# Patient Record
Sex: FEMALE | Race: OTHER | HISPANIC OR LATINO | URBAN - METROPOLITAN AREA
[De-identification: names, ages, dates, MRNs, and addresses within clinical notes are randomized per-mention and may not be internally consistent; named-entity substitution may affect disease eponyms.]

---

## 2019-04-17 ENCOUNTER — OUTPATIENT (OUTPATIENT)
Dept: OUTPATIENT SERVICES | Facility: HOSPITAL | Age: 74
LOS: 1 days | Discharge: HOME | End: 2019-04-17

## 2019-04-17 DIAGNOSIS — Z00.00 ENCOUNTER FOR GENERAL ADULT MEDICAL EXAMINATION WITHOUT ABNORMAL FINDINGS: ICD-10-CM

## 2019-06-13 ENCOUNTER — APPOINTMENT (OUTPATIENT)
Dept: NEUROSURGERY | Facility: CLINIC | Age: 74
End: 2019-06-13
Payer: MEDICARE

## 2019-06-13 VITALS — BODY MASS INDEX: 28 KG/M2 | HEIGHT: 64 IN | WEIGHT: 164 LBS

## 2019-06-13 VITALS — WEIGHT: 165 LBS | HEIGHT: 64 IN | BODY MASS INDEX: 28.17 KG/M2

## 2019-06-13 DIAGNOSIS — M54.16 RADICULOPATHY, LUMBAR REGION: ICD-10-CM

## 2019-06-13 DIAGNOSIS — Z86.69 PERSONAL HISTORY OF OTHER DISEASES OF THE NERVOUS SYSTEM AND SENSE ORGANS: ICD-10-CM

## 2019-06-13 DIAGNOSIS — Z82.3 FAMILY HISTORY OF STROKE: ICD-10-CM

## 2019-06-13 DIAGNOSIS — Z80.0 FAMILY HISTORY OF MALIGNANT NEOPLASM OF DIGESTIVE ORGANS: ICD-10-CM

## 2019-06-13 PROCEDURE — 99205 OFFICE O/P NEW HI 60 MIN: CPT

## 2019-06-13 RX ORDER — GABAPENTIN 300 MG/1
300 CAPSULE ORAL EVERY 8 HOURS
Qty: 90 | Refills: 5 | Status: ACTIVE | COMMUNITY
Start: 2019-06-13 | End: 1900-01-01

## 2019-06-13 NOTE — PLAN
[FreeTextEntry1] : Patient referred to pain management and physical therapy. \par Prescribe gabapentin.

## 2019-06-13 NOTE — HISTORY OF PRESENT ILLNESS
[FreeTextEntry1] : low back pain [de-identified] : This is a 73yrs old female who presents today accompanied by her son presents today for a consultation of left sided back pain radiating to the left lateral leg down to the left ankle since December 2018. It is associated with numbness on the left big toe and occasional weakness. Patient also mentioned in 2015 she was experiencing low back pain radiating to the right groin and lateral leg. Patient participated in physical therapy in the past and had temporary relief of her symptoms. She has not had a consultation with pain management. Pain is worse with prolong standing and walking, and pain is better when she takes ibuprofen. \par Patient does not have any recent imaging.

## 2019-09-26 ENCOUNTER — APPOINTMENT (OUTPATIENT)
Dept: OBGYN | Facility: CLINIC | Age: 74
End: 2019-09-26
Payer: MEDICARE

## 2019-09-26 ENCOUNTER — LABORATORY RESULT (OUTPATIENT)
Age: 74
End: 2019-09-26

## 2019-09-26 VITALS — BODY MASS INDEX: 27.81 KG/M2 | SYSTOLIC BLOOD PRESSURE: 132 MMHG | DIASTOLIC BLOOD PRESSURE: 78 MMHG | WEIGHT: 162 LBS

## 2019-09-26 DIAGNOSIS — Z83.2 FAMILY HISTORY OF DISEASES OF THE BLOOD AND BLOOD-FORMING ORGANS AND CERTAIN DISORDERS INVOLVING THE IMMUNE MECHANISM: ICD-10-CM

## 2019-09-26 PROCEDURE — 99397 PER PM REEVAL EST PAT 65+ YR: CPT

## 2019-09-26 PROCEDURE — 99212 OFFICE O/P EST SF 10 MIN: CPT | Mod: 25

## 2019-10-03 PROBLEM — Z83.2 FAMILY HISTORY OF THROMBOCYTOPENIA: Status: ACTIVE | Noted: 2019-09-26

## 2019-10-03 NOTE — PHYSICAL EXAM
[Awake] : awake [Alert] : alert [Soft] : soft [Oriented x3] : oriented to person, place, and time [Normal] : cervix [Uterine Adnexae] : were not tender and not enlarged [No Bleeding] : there was no active vaginal bleeding [RRR, No Murmurs] : RRR, no murmurs [CTAB] : CTAB [Acute Distress] : no acute distress [LAD] : no lymphadenopathy [Thyroid Nodule] : no thyroid nodule [Mass] : no breast mass [Goiter] : no goiter [Nipple Discharge] : no nipple discharge [Axillary LAD] : no axillary lymphadenopathy [Tender] : non tender [Distended] : not distended [H/Smegaly] : no hepatosplenomegaly [Depressed Mood] : not depressed [Flat Affect] : affect not flat

## 2019-10-09 ENCOUNTER — APPOINTMENT (OUTPATIENT)
Dept: OBGYN | Facility: CLINIC | Age: 74
End: 2019-10-09
Payer: MEDICARE

## 2019-10-09 ENCOUNTER — ASOB RESULT (OUTPATIENT)
Age: 74
End: 2019-10-09

## 2019-10-09 PROCEDURE — 76830 TRANSVAGINAL US NON-OB: CPT

## 2019-10-16 ENCOUNTER — APPOINTMENT (OUTPATIENT)
Dept: OBGYN | Facility: CLINIC | Age: 74
End: 2019-10-16
Payer: MEDICARE

## 2019-10-16 ENCOUNTER — LABORATORY RESULT (OUTPATIENT)
Age: 74
End: 2019-10-16

## 2019-10-16 VITALS — BODY MASS INDEX: 27.81 KG/M2 | WEIGHT: 162 LBS | DIASTOLIC BLOOD PRESSURE: 80 MMHG | SYSTOLIC BLOOD PRESSURE: 130 MMHG

## 2019-10-16 DIAGNOSIS — N90.89 OTHER SPECIFIED NONINFLAMMATORY DISORDERS OF VULVA AND PERINEUM: ICD-10-CM

## 2019-10-16 DIAGNOSIS — B35.6 TINEA CRURIS: ICD-10-CM

## 2019-10-16 PROCEDURE — 99212 OFFICE O/P EST SF 10 MIN: CPT | Mod: 25

## 2019-10-16 PROCEDURE — 11422 EXC H-F-NK-SP B9+MARG 1.1-2: CPT

## 2019-10-16 PROCEDURE — 56605 BIOPSY OF VULVA/PERINEUM: CPT

## 2019-10-16 RX ORDER — CLOTRIMAZOLE AND BETAMETHASONE DIPROPIONATE 10; .5 MG/ML; MG/ML
1-0.05 LOTION TOPICAL TWICE DAILY
Qty: 1 | Refills: 3 | Status: ACTIVE | COMMUNITY
Start: 2019-10-16 | End: 1900-01-01

## 2019-10-16 NOTE — PHYSICAL EXAM
[Normal] : uterus [No Bleeding] : there was no active vaginal bleeding [Uterine Adnexae] : were not tender and not enlarged [de-identified] : left forearm ring like lession raised edges and clear center non tender .  [de-identified] : left labia 2-3 cm nodule

## 2019-10-23 ENCOUNTER — APPOINTMENT (OUTPATIENT)
Dept: OBGYN | Facility: CLINIC | Age: 74
End: 2019-10-23
Payer: MEDICARE

## 2019-10-23 VITALS — DIASTOLIC BLOOD PRESSURE: 70 MMHG | WEIGHT: 162 LBS | BODY MASS INDEX: 27.81 KG/M2 | SYSTOLIC BLOOD PRESSURE: 125 MMHG

## 2019-10-23 DIAGNOSIS — N76.3 SUBACUTE AND CHRONIC VULVITIS: ICD-10-CM

## 2019-10-23 PROCEDURE — 99024 POSTOP FOLLOW-UP VISIT: CPT

## 2019-10-23 NOTE — PHYSICAL EXAM
[Normal] : uterus [No Bleeding] : there was no active vaginal bleeding [Uterine Adnexae] : were not tender and not enlarged [de-identified] : sutures removed healing well .

## 2019-11-08 ENCOUNTER — APPOINTMENT (OUTPATIENT)
Dept: OBGYN | Facility: CLINIC | Age: 74
End: 2019-11-08
Payer: MEDICARE

## 2019-11-08 VITALS — BODY MASS INDEX: 27.81 KG/M2 | SYSTOLIC BLOOD PRESSURE: 116 MMHG | WEIGHT: 162 LBS | DIASTOLIC BLOOD PRESSURE: 78 MMHG

## 2019-11-08 PROCEDURE — 99213 OFFICE O/P EST LOW 20 MIN: CPT

## 2019-11-08 NOTE — COUNSELING
[Breast Self Exam] : breast self exam [Nutrition] : nutrition [Exercise] : exercise [Vitamins/Supplements] : vitamins/supplements [STD (testing, results, tx)] : STD (testing, results, tx) [Vulvar Hygiene] : vulvar hygiene [Weight Management] : weight management

## 2019-11-13 ENCOUNTER — TRANSCRIPTION ENCOUNTER (OUTPATIENT)
Age: 74
End: 2019-11-13

## 2019-11-13 ENCOUNTER — APPOINTMENT (OUTPATIENT)
Dept: GYNECOLOGIC ONCOLOGY | Facility: CLINIC | Age: 74
End: 2019-11-13
Payer: MEDICARE

## 2019-11-13 VITALS
HEART RATE: 83 BPM | BODY MASS INDEX: 27.85 KG/M2 | DIASTOLIC BLOOD PRESSURE: 83 MMHG | SYSTOLIC BLOOD PRESSURE: 162 MMHG | HEIGHT: 64 IN | WEIGHT: 163.13 LBS

## 2019-11-13 DIAGNOSIS — Z78.9 OTHER SPECIFIED HEALTH STATUS: ICD-10-CM

## 2019-11-13 DIAGNOSIS — Z87.891 PERSONAL HISTORY OF NICOTINE DEPENDENCE: ICD-10-CM

## 2019-11-13 DIAGNOSIS — Z80.49 FAMILY HISTORY OF MALIGNANT NEOPLASM OF OTHER GENITAL ORGANS: ICD-10-CM

## 2019-11-13 DIAGNOSIS — G47.33 OBSTRUCTIVE SLEEP APNEA (ADULT) (PEDIATRIC): ICD-10-CM

## 2019-11-13 PROCEDURE — 99205 OFFICE O/P NEW HI 60 MIN: CPT

## 2019-11-13 PROCEDURE — 99215 OFFICE O/P EST HI 40 MIN: CPT

## 2019-11-13 PROCEDURE — 99245 OFF/OP CONSLTJ NEW/EST HI 55: CPT

## 2019-11-21 ENCOUNTER — APPOINTMENT (OUTPATIENT)
Dept: OBGYN | Facility: HOSPITAL | Age: 74
End: 2019-11-21
Payer: MEDICARE

## 2019-11-21 VITALS — SYSTOLIC BLOOD PRESSURE: 118 MMHG | BODY MASS INDEX: 27.29 KG/M2 | DIASTOLIC BLOOD PRESSURE: 82 MMHG | WEIGHT: 159 LBS

## 2019-11-21 PROCEDURE — 99213 OFFICE O/P EST LOW 20 MIN: CPT

## 2020-01-08 ENCOUNTER — APPOINTMENT (OUTPATIENT)
Dept: GYNECOLOGIC ONCOLOGY | Facility: CLINIC | Age: 75
End: 2020-01-08

## 2020-01-14 ENCOUNTER — OUTPATIENT (OUTPATIENT)
Dept: OUTPATIENT SERVICES | Facility: HOSPITAL | Age: 75
LOS: 1 days | End: 2020-01-14
Payer: MEDICARE

## 2020-01-14 VITALS
DIASTOLIC BLOOD PRESSURE: 86 MMHG | SYSTOLIC BLOOD PRESSURE: 142 MMHG | WEIGHT: 164.02 LBS | OXYGEN SATURATION: 98 % | HEART RATE: 75 BPM | TEMPERATURE: 99 F | HEIGHT: 63.5 IN | RESPIRATION RATE: 15 BRPM

## 2020-01-14 DIAGNOSIS — C51.9 MALIGNANT NEOPLASM OF VULVA, UNSPECIFIED: ICD-10-CM

## 2020-01-14 DIAGNOSIS — H26.9 UNSPECIFIED CATARACT: Chronic | ICD-10-CM

## 2020-01-14 LAB
ANION GAP SERPL CALC-SCNC: 14 MMO/L — SIGNIFICANT CHANGE UP (ref 7–14)
BUN SERPL-MCNC: 17 MG/DL — SIGNIFICANT CHANGE UP (ref 7–23)
CALCIUM SERPL-MCNC: 9.5 MG/DL — SIGNIFICANT CHANGE UP (ref 8.4–10.5)
CHLORIDE SERPL-SCNC: 102 MMOL/L — SIGNIFICANT CHANGE UP (ref 98–107)
CO2 SERPL-SCNC: 25 MMOL/L — SIGNIFICANT CHANGE UP (ref 22–31)
CREAT SERPL-MCNC: 0.95 MG/DL — SIGNIFICANT CHANGE UP (ref 0.5–1.3)
GLUCOSE SERPL-MCNC: 92 MG/DL — SIGNIFICANT CHANGE UP (ref 70–99)
HCT VFR BLD CALC: 39.5 % — SIGNIFICANT CHANGE UP (ref 34.5–45)
HGB BLD-MCNC: 13.4 G/DL — SIGNIFICANT CHANGE UP (ref 11.5–15.5)
MCHC RBC-ENTMCNC: 32.7 PG — SIGNIFICANT CHANGE UP (ref 27–34)
MCHC RBC-ENTMCNC: 33.9 % — SIGNIFICANT CHANGE UP (ref 32–36)
MCV RBC AUTO: 96.3 FL — SIGNIFICANT CHANGE UP (ref 80–100)
NRBC # FLD: 0 K/UL — SIGNIFICANT CHANGE UP (ref 0–0)
PLATELET # BLD AUTO: 298 K/UL — SIGNIFICANT CHANGE UP (ref 150–400)
PMV BLD: 9.2 FL — SIGNIFICANT CHANGE UP (ref 7–13)
POTASSIUM SERPL-MCNC: 3.6 MMOL/L — SIGNIFICANT CHANGE UP (ref 3.5–5.3)
POTASSIUM SERPL-SCNC: 3.6 MMOL/L — SIGNIFICANT CHANGE UP (ref 3.5–5.3)
RBC # BLD: 4.1 M/UL — SIGNIFICANT CHANGE UP (ref 3.8–5.2)
RBC # FLD: 12.5 % — SIGNIFICANT CHANGE UP (ref 10.3–14.5)
SODIUM SERPL-SCNC: 141 MMOL/L — SIGNIFICANT CHANGE UP (ref 135–145)
WBC # BLD: 8.28 K/UL — SIGNIFICANT CHANGE UP (ref 3.8–10.5)
WBC # FLD AUTO: 8.28 K/UL — SIGNIFICANT CHANGE UP (ref 3.8–10.5)

## 2020-01-14 PROCEDURE — 93010 ELECTROCARDIOGRAM REPORT: CPT

## 2020-01-14 NOTE — H&P PST ADULT - HISTORY OF PRESENT ILLNESS
73y/o female presents for preop eval for scheduled simple vulvectomy on 1/17/2020.  Pt states has small lump, biopsy done and was positive for cancer cell.  Preop Dx malignant neoplasm of vulva.

## 2020-01-14 NOTE — H&P PST ADULT - ATTENDING COMMENTS
pt seen and evaluated, plan for EUA, left simple vulvectomy, all indicated procedures  discussed risks including bleeding, infection, injury to bowel/bladder/vessels/nerves/ureters, risks of blood transfusion, reoperation, ICU admission  postop care discussed  son and daughter at bedside.

## 2020-01-14 NOTE — H&P PST ADULT - NSICDXPROBLEM_GEN_ALL_CORE_FT
PROBLEM DIAGNOSES  Problem: Malignant neoplasm of vulva  Assessment and Plan: Written & verbal preop instructions, gi prophylaxis given  pt verbalized understanding  medical eval requested by surgeon  pending copy of report PROBLEM DIAGNOSES  Problem: Malignant neoplasm of vulva  Assessment and Plan: Written & verbal preop instructions, gi prophylaxis given  pt verbalized understanding  medical eval requested by surgeon  pending copy of report  with comparison eKg

## 2020-01-16 ENCOUNTER — TRANSCRIPTION ENCOUNTER (OUTPATIENT)
Age: 75
End: 2020-01-16

## 2020-01-17 ENCOUNTER — APPOINTMENT (OUTPATIENT)
Dept: GYNECOLOGIC ONCOLOGY | Facility: HOSPITAL | Age: 75
End: 2020-01-17

## 2020-01-17 ENCOUNTER — RESULT REVIEW (OUTPATIENT)
Age: 75
End: 2020-01-17

## 2020-01-17 ENCOUNTER — OUTPATIENT (OUTPATIENT)
Dept: OUTPATIENT SERVICES | Facility: HOSPITAL | Age: 75
LOS: 1 days | Discharge: HOME | End: 2020-01-17
Payer: MEDICARE

## 2020-01-17 VITALS
DIASTOLIC BLOOD PRESSURE: 88 MMHG | TEMPERATURE: 98 F | RESPIRATION RATE: 18 BRPM | SYSTOLIC BLOOD PRESSURE: 169 MMHG | HEIGHT: 63.5 IN | OXYGEN SATURATION: 96 % | WEIGHT: 164.02 LBS | HEART RATE: 66 BPM

## 2020-01-17 VITALS
HEART RATE: 59 BPM | RESPIRATION RATE: 16 BRPM | SYSTOLIC BLOOD PRESSURE: 146 MMHG | OXYGEN SATURATION: 100 % | DIASTOLIC BLOOD PRESSURE: 78 MMHG

## 2020-01-17 DIAGNOSIS — C51.9 MALIGNANT NEOPLASM OF VULVA, UNSPECIFIED: ICD-10-CM

## 2020-01-17 DIAGNOSIS — H26.9 UNSPECIFIED CATARACT: Chronic | ICD-10-CM

## 2020-01-17 PROCEDURE — 88307 TISSUE EXAM BY PATHOLOGIST: CPT | Mod: 26

## 2020-01-17 PROCEDURE — 56620 VULVECTOMY SIMPLE PARTIAL: CPT

## 2020-01-17 RX ORDER — SODIUM CHLORIDE 9 MG/ML
1000 INJECTION, SOLUTION INTRAVENOUS
Refills: 0 | Status: DISCONTINUED | OUTPATIENT
Start: 2020-01-17 | End: 2020-02-03

## 2020-01-17 RX ORDER — SODIUM CHLORIDE 9 MG/ML
1000 INJECTION, SOLUTION INTRAVENOUS
Refills: 0 | Status: DISCONTINUED | OUTPATIENT
Start: 2020-01-17 | End: 2020-01-17

## 2020-01-17 RX ORDER — OXYCODONE HYDROCHLORIDE 5 MG/1
1 TABLET ORAL
Qty: 5 | Refills: 0
Start: 2020-01-17

## 2020-01-17 NOTE — ASU DISCHARGE PLAN (ADULT/PEDIATRIC) - ASU DC SPECIAL INSTRUCTIONSFT
You were given tyelnol in the operating room, so you may not take any tyelnol product until ___2:15pm________

## 2020-01-17 NOTE — ASU DISCHARGE PLAN (ADULT/PEDIATRIC) - CARE PROVIDER_API CALL
Leslie Tucker)  Gynecologic Oncology; Obstetrics and Gynecology  [facility], [address] [place] [address]  Phone: [phone]  Fax: [phone]  Follow Up Time:

## 2020-01-17 NOTE — ASU DISCHARGE PLAN (ADULT/PEDIATRIC) - CALL YOUR DOCTOR IF YOU HAVE ANY OF THE FOLLOWING:
Inability to tolerate liquids or foods/Bleeding that does not stop/Nausea and vomiting that does not stop/Unable to urinate/Fever greater than (need to indicate Fahrenheit or Celsius)/Pain not relieved by Medications Bleeding that does not stop/Wound/Surgical Site with redness, or foul smelling discharge or pus/Unable to urinate/Pain not relieved by Medications/Nausea and vomiting that does not stop/Inability to tolerate liquids or foods/Fever greater than (need to indicate Fahrenheit or Celsius)

## 2020-01-21 PROBLEM — C51.9 MALIGNANT NEOPLASM OF VULVA, UNSPECIFIED: Chronic | Status: ACTIVE | Noted: 2020-01-14

## 2020-01-23 LAB — SURGICAL PATHOLOGY STUDY: SIGNIFICANT CHANGE UP

## 2020-01-29 ENCOUNTER — APPOINTMENT (OUTPATIENT)
Dept: GYNECOLOGIC ONCOLOGY | Facility: CLINIC | Age: 75
End: 2020-01-29
Payer: MEDICARE

## 2020-01-29 VITALS
SYSTOLIC BLOOD PRESSURE: 174 MMHG | TEMPERATURE: 97.9 F | HEIGHT: 64 IN | HEART RATE: 75 BPM | WEIGHT: 170.25 LBS | DIASTOLIC BLOOD PRESSURE: 90 MMHG | BODY MASS INDEX: 29.06 KG/M2

## 2020-01-29 PROCEDURE — 99024 POSTOP FOLLOW-UP VISIT: CPT

## 2020-04-01 ENCOUNTER — APPOINTMENT (OUTPATIENT)
Dept: OBGYN | Facility: CLINIC | Age: 75
End: 2020-04-01
Payer: MEDICARE

## 2020-04-01 VITALS — DIASTOLIC BLOOD PRESSURE: 68 MMHG | BODY MASS INDEX: 28.49 KG/M2 | WEIGHT: 166 LBS | SYSTOLIC BLOOD PRESSURE: 136 MMHG

## 2020-04-01 PROCEDURE — 99024 POSTOP FOLLOW-UP VISIT: CPT

## 2020-04-01 PROCEDURE — 99212 OFFICE O/P EST SF 10 MIN: CPT

## 2020-04-01 RX ORDER — CIPROFLOXACIN HYDROCHLORIDE 250 MG/1
250 TABLET, FILM COATED ORAL
Qty: 10 | Refills: 0 | Status: COMPLETED | COMMUNITY
Start: 2019-10-16 | End: 2020-04-01

## 2020-04-01 NOTE — PHYSICAL EXAM
[Normal] : uterus [No Bleeding] : there was no active vaginal bleeding [Uterine Adnexae] : were not tender and not enlarged [de-identified] : healed well

## 2020-07-01 ENCOUNTER — APPOINTMENT (OUTPATIENT)
Dept: GYNECOLOGIC ONCOLOGY | Facility: CLINIC | Age: 75
End: 2020-07-01
Payer: MEDICARE

## 2020-07-01 VITALS
WEIGHT: 163 LBS | BODY MASS INDEX: 27.83 KG/M2 | HEART RATE: 72 BPM | HEIGHT: 64 IN | DIASTOLIC BLOOD PRESSURE: 81 MMHG | SYSTOLIC BLOOD PRESSURE: 171 MMHG

## 2020-07-01 DIAGNOSIS — Z08 ENCOUNTER FOR FOLLOW-UP EXAMINATION AFTER COMPLETED TREATMENT FOR MALIGNANT NEOPLASM: ICD-10-CM

## 2020-07-01 DIAGNOSIS — C51.9 MALIGNANT NEOPLASM OF VULVA, UNSPECIFIED: ICD-10-CM

## 2020-07-01 DIAGNOSIS — Z85.44 ENCOUNTER FOR FOLLOW-UP EXAMINATION AFTER COMPLETED TREATMENT FOR MALIGNANT NEOPLASM: ICD-10-CM

## 2020-07-01 PROCEDURE — 99213 OFFICE O/P EST LOW 20 MIN: CPT

## 2020-07-10 ENCOUNTER — INPATIENT (INPATIENT)
Facility: HOSPITAL | Age: 75
LOS: 4 days | Discharge: NURSING, REHAB OR LONG-TERM CARE | End: 2020-07-15
Attending: INTERNAL MEDICINE | Admitting: INTERNAL MEDICINE
Payer: MEDICARE

## 2020-07-10 VITALS
TEMPERATURE: 99 F | DIASTOLIC BLOOD PRESSURE: 89 MMHG | HEIGHT: 64 IN | RESPIRATION RATE: 18 BRPM | HEART RATE: 94 BPM | SYSTOLIC BLOOD PRESSURE: 164 MMHG | OXYGEN SATURATION: 98 % | WEIGHT: 164.91 LBS

## 2020-07-10 DIAGNOSIS — H26.9 UNSPECIFIED CATARACT: Chronic | ICD-10-CM

## 2020-07-10 PROCEDURE — 73562 X-RAY EXAM OF KNEE 3: CPT | Mod: 26,LT

## 2020-07-10 PROCEDURE — 73090 X-RAY EXAM OF FOREARM: CPT | Mod: 26,LT

## 2020-07-10 PROCEDURE — 99285 EMERGENCY DEPT VISIT HI MDM: CPT

## 2020-07-10 PROCEDURE — 72170 X-RAY EXAM OF PELVIS: CPT | Mod: 26

## 2020-07-10 PROCEDURE — 73110 X-RAY EXAM OF WRIST: CPT | Mod: 26,LT

## 2020-07-10 PROCEDURE — 73552 X-RAY EXAM OF FEMUR 2/>: CPT | Mod: 26,LT

## 2020-07-10 PROCEDURE — 73130 X-RAY EXAM OF HAND: CPT | Mod: 26,LT

## 2020-07-10 RX ORDER — MORPHINE SULFATE 50 MG/1
4 CAPSULE, EXTENDED RELEASE ORAL ONCE
Refills: 0 | Status: DISCONTINUED | OUTPATIENT
Start: 2020-07-10 | End: 2020-07-10

## 2020-07-10 RX ADMIN — MORPHINE SULFATE 4 MILLIGRAM(S): 50 CAPSULE, EXTENDED RELEASE ORAL at 21:28

## 2020-07-10 NOTE — ED PROVIDER NOTE - PATIENT PORTAL LINK FT
You can access the FollowMyHealth Patient Portal offered by [facility] by registering at the following website: [email or web address]. By joining [facility]’s FollowMyHealth portal, you will also be able to view your health information using other applications (apps) compatible with our system.

## 2020-07-10 NOTE — ED PROVIDER NOTE - CARE PLAN
Principal Discharge DX:	Radius fracture  Secondary Diagnosis:	Fall Principal Discharge DX:	Radius fracture  Secondary Diagnosis:	Fall  Secondary Diagnosis:	Left hip pain  Secondary Diagnosis:	Unable to walk

## 2020-07-10 NOTE — ED ADULT TRIAGE NOTE - CHIEF COMPLAINT QUOTE
In my house I slipped on the water from the window, my left arm, tailbone and dleft leg hurt - patient     Patient denies hitting her head, denies medications, denies LOC

## 2020-07-10 NOTE — ED PROVIDER NOTE - PROGRESS NOTE DETAILS
Johnna- performed hematoma block, reduction done with Dr. Dunbar. Placed sugar tong splint. Ortho consulted. Dienes- Ortho redid reduction twice. Awaiting repeat xrays. Patient with cast in place. Patient is resting. Family has been updated multiple times during ED course, repeatedly asking for additional updates. Patient unable to walk. Will do CT scan of left hip. Patient unable to walk. Will do CT scan of left hip. Ortho aware. Signed out to MAR. Patient unable to walk. Will do CT scan of left hip. Ortho aware. Updated son/daughter on patient's medical course.

## 2020-07-10 NOTE — ED PROVIDER NOTE - CLINICAL SUMMARY MEDICAL DECISION MAKING FREE TEXT BOX
pt with wrist fx, reduced by ortho. imaging, splint, nvi. dc hme. pt with wrist fx, reduced by ortho. imaging, splint, nvi. pt unable to ambulate. admitted to hosp for rehab.

## 2020-07-10 NOTE — ED PROCEDURE NOTE - ATTENDING CONTRIBUTION TO CARE
I was present for and supervised the key and critical aspects of the procedures performed during the care of the patient.  Hematoma block
I was present for and supervised the key and critical aspects of the procedures performed during the care of the patient.  Fracture reduction and immobilization

## 2020-07-10 NOTE — ED PROVIDER NOTE - ATTENDING CONTRIBUTION TO CARE
I personally evaluated the patient. I reviewed the Resident’s or Physician Assistant’s note (as assigned above), and agree with the findings and plan except as documented in my note.  73 yo woman with FOOSH.  Mostly with severe wrist pain and deformity noted.  Other minor contusions as well.  XRAY revealed a displaced Colles fracture.  Reduction and splint performed.  Called ortho to review the films.  they were unhappy with reduction and will be down to reduce and cast patient.

## 2020-07-10 NOTE — ED PROVIDER NOTE - NS ED ROS FT
Constitutional:  No fevers or chills.  Eyes:  No visual changes.  ENT:  No sore throat.  Neck:  No neck pain.  Cardiac:  No CP or edema.  Resp:  No cough or SOB.  GI:  No vomiting, diarrhea, or abdominal pain.  :  No dysuria, frequency, or hematuria.  MSK:  +Left wrist deformity/swelling.  Neuro:  No headache, dizziness, or weakness.  Skin:  No skin rash.

## 2020-07-10 NOTE — ED PROCEDURE NOTE - CPROC ED POST PROC CARE GUIDE1
Verbal/written post procedure instructions were given to patient/caregiver.
Elevate the injured extremity as instructed./Keep the cast/splint/dressing clean and dry./Verbal/written post procedure instructions were given to patient/caregiver.

## 2020-07-10 NOTE — ED PROVIDER NOTE - OBJECTIVE STATEMENT
75yo F with PMH of cholecystectomy and sciatica, otherwise healthy, no blood thinners, presenting to ED w/ left wrist pain s/p FOOSH injury that occurred just PTA. Patient also endorses sacral pain, Patient slipped on water and landed on left outstretched hand/arm, +left wrist swelling. Denies any head/chest/abdominal trauma. No f/c, recent sickness, vision changes, HA, neck/back pain, hip pain, paresthesias/numbness/tingling, urinary/fecal incontinence, extremity discoloration, or other complaints. Former smoker.

## 2020-07-10 NOTE — ED PROVIDER NOTE - NSFOLLOWUPINSTRUCTIONS_ED_ALL_ED_FT
Please follow-up with Dr. Jalloh as soon as possible.    Wrist Fracture in Adults    WHAT YOU NEED TO KNOW:    What is a wrist fracture? A wrist fracture is a break in one or more of the bones in your wrist. Adult Arm Bones         What are the signs and symptoms of a wrist fracture?     Pain, swelling, and bruising of your injured wrist      Wrist pain that is worse when you hold something or put pressure on your wrist      Weakness, numbness, or tingling in your injured hand or wrist      Trouble moving your wrist, hand, or fingers      A change in the shape of your wrist    How is a wrist fracture diagnosed? Your healthcare provider will examine you. You may need an x-ray, CT scan, or MRI. You may be given contrast liquid to help your wrist bones show up better in pictures. Tell the healthcare provider if you have ever had an allergic reaction to contrast liquid. Do not enter the MRI room with anything metal. Metal can cause serious injury. Tell the healthcare provider if you have any metal in or on your body.    How is a wrist fracture treated? Treatment will depend on which wrist bone was broken and the kind of fracture you have. You may need any of the following:     Medicine may be given to decrease pain and swelling. You may need antibiotic medicine or a tetanus shot if there is a break in your skin.      A cast, splint, or brace may be placed on your wrist to decrease movement. These devices will help hold the bones in place while they heal.      Traction may be needed if your bone broke into 2 pieces. Traction pulls on the bone pieces to pull them back into place. A pin may be put in your bone or cast and hooked to ropes and a pulley. Weight is hung on the rope to help pull on the bones so they will heal correctly.      A closed reduction is a procedure to put your bones into the correct position without surgery.      Surgery may be needed to put your bones back into the correct position. Wires, pins, plates or screws may be used to help hold the bones in place.    How can I manage my symptoms?     Rest as much as possible. Do not play contact sports until the healthcare provider says it is okay.       Apply ice on your wrist for 15 to 20 minutes every hour or as directed. Use an ice pack, or put crushed ice in a plastic bag. Cover it with a towel before you place it on your skin. Ice helps prevent tissue damage and decreases swelling and pain.      Elevate your wrist above the level of your heart as often as possible. This will help decrease swelling and pain. Prop your wrist on pillows or blankets to keep it elevated comfortably.           Go to physical therapy as directed. You may need physical therapy after your wrist heals and the cast is removed. A physical therapist can teach you exercises to help improve movement and strength and to decrease pain.    When should I seek immediate care?     Your pain gets worse or does not get better after you take pain medicine.      Your cast or splint breaks, gets wet, or is damaged.      Your hand or fingers feel numb or cold.      Your hand or fingers turn white or blue.      Your splint or cast feels too tight.      You have more pain or swelling after the cast or splint is put on.    When should I call my doctor?     You have a fever.      There is a foul smell or blood coming from under the cast.      You have questions or concerns about your condition or care.      Wrist Splint, Adult  A wrist splint is a device that prevents your wrist from moving. A splint supports your wrist like a cast, but it is more flexible. It can be removed or loosened. The supporting part of a splint does not completely surround your wrist. It is held in place with an elastic band or straps.  You may need a wrist splint if you have hurt your wrist or if you have a condition that causes swelling. Depending on the type of wrist problem you have, your splint may extend up your arm, onto your hand, or around your thumb. The wrist splint may be worn to:  Support your wrist.Protect your injury.Prevent further injury.Prevent movement.Reduce pain.Help with healing.It is important to follow instructions from your health care provider about when to wear the splint to make sure your wrist heals correctly.  What are the risks?  The most dangerous complication of wearing a splint is having a reduced blood supply to your wrist or hand. This can happen if there is a lot of swelling or if the splint is too tight. Limited blood supply results in a condition called compartment syndrome and can cause permanent damage. Symptoms include:  Pain that is getting worse.Tingling and numbness.Changes in skin color, including paleness or a bluish color.Cold fingers.Other complications of wearing a splint can include:  Skin irritation that can cause:  Itching.Rash.Skin sores.Skin infection.Wrist stiffness. This can occur if you have worn a splint for a long time.Wrist weakness.How to use your wrist splint  A wrist with a fitted splint.   Your wrist splint should be tight enough to support your wrist without blocking your blood supply. How long you need to wear the splint depends on the type of wrist problem you have. Your health care provider will instruct you about how to wear your wrist splint and how long to wear it.  Splint wear     Wear the splint as told by your health care provider. Remove it only as told by your health care provider.Loosen the splint if your fingers tingle, become numb, or turn cold and blue.Keep the splint clean.If the splint is not waterproof:  Do not let it get wet.Cover it with a watertight covering when you take a bath or a shower.Do not stick anything inside the splint to scratch your skin. Doing that increases your risk of infection.Check the skin under the splint for any redness or blisters every time you take off the splint. Tell your health care provider about any concerns.Managing pain, stiffness, and swelling     If directed, put ice on the injured area.  If you a have a removable splint, remove it as told by your health care provider.Put ice in a plastic bag.Place a towel between your skin and the bag.Leave the ice on for 20 minutes, 2–3 times a day.Move your fingers often to avoid stiffness and to lessen swelling.Raise (elevate) the injured area above the level of your heart while you are sitting or lying down.Activity     Return to your normal activities as told by your health care provider. Ask your health care provider what activities are safe for you.Do exercises as told by your health care provider.Ask your health care provider when it is safe to drive with a splint on your wrist.General instructions     Do not use the injured limb to support (bear) your body weight until your health care provider says that you can.Do not put pressure on any part of the splint until it is fully hardened. This may take several hours.Do not use any products that contain nicotine or tobacco, such as cigarettes and e-cigarettes. If you need help quitting, ask your health care provider.Take over-the-counter and prescription medicines only as told by your health care provider.Keep all follow-up visits as told by your health care provider. This is important.Contact a health care provider if:  You have wrist pain or swelling that does not go away.The skin around or under your splint becomes red, itchy, or moist.You have chills or a fever.Your splint feels too tight or too loose.Your splint gets damaged.Get help right away if:  You have pain that is getting worse.You have tingling and numbness.You have changes in skin color, including paleness or a bluish color.Your fingers are cold.Summary  A wrist splint is a flexible device that supports your wrist and prevents it from moving.Follow instructions from your health care provider about when to wear the splint to make sure your wrist heals correctly.Icing, moving your fingers, and raising (elevating) your wrist above the level of your heart will help you manage pain, stiffness, and swelling.The most dangerous complication of wearing a splint is having a reduced blood supply to your wrist or hand. If your fingers tingle, become numb, or turn cold Please follow-up with Dr. Kendrick as soon as possible.    Wrist Fracture in Adults    WHAT YOU NEED TO KNOW:    What is a wrist fracture? A wrist fracture is a break in one or more of the bones in your wrist. Adult Arm Bones    What are the signs and symptoms of a wrist fracture?     Pain, swelling, and bruising of your injured wrist      Wrist pain that is worse when you hold something or put pressure on your wrist      Weakness, numbness, or tingling in your injured hand or wrist      Trouble moving your wrist, hand, or fingers      A change in the shape of your wrist    How is a wrist fracture diagnosed? Your healthcare provider will examine you. You may need an x-ray, CT scan, or MRI. You may be given contrast liquid to help your wrist bones show up better in pictures. Tell the healthcare provider if you have ever had an allergic reaction to contrast liquid. Do not enter the MRI room with anything metal. Metal can cause serious injury. Tell the healthcare provider if you have any metal in or on your body.    How is a wrist fracture treated? Treatment will depend on which wrist bone was broken and the kind of fracture you have. You may need any of the following:     Medicine may be given to decrease pain and swelling. You may need antibiotic medicine or a tetanus shot if there is a break in your skin.      A cast, splint, or brace may be placed on your wrist to decrease movement. These devices will help hold the bones in place while they heal.      Traction may be needed if your bone broke into 2 pieces. Traction pulls on the bone pieces to pull them back into place. A pin may be put in your bone or cast and hooked to ropes and a pulley. Weight is hung on the rope to help pull on the bones so they will heal correctly.      A closed reduction is a procedure to put your bones into the correct position without surgery.      Surgery may be needed to put your bones back into the correct position. Wires, pins, plates or screws may be used to help hold the bones in place.    How can I manage my symptoms?     Rest as much as possible. Do not play contact sports until the healthcare provider says it is okay.       Apply ice on your wrist for 15 to 20 minutes every hour or as directed. Use an ice pack, or put crushed ice in a plastic bag. Cover it with a towel before you place it on your skin. Ice helps prevent tissue damage and decreases swelling and pain.      Elevate your wrist above the level of your heart as often as possible. This will help decrease swelling and pain. Prop your wrist on pillows or blankets to keep it elevated comfortably.           Go to physical therapy as directed. You may need physical therapy after your wrist heals and the cast is removed. A physical therapist can teach you exercises to help improve movement and strength and to decrease pain.    When should I seek immediate care?     Your pain gets worse or does not get better after you take pain medicine.      Your cast or splint breaks, gets wet, or is damaged.      Your hand or fingers feel numb or cold.      Your hand or fingers turn white or blue.      Your splint or cast feels too tight.      You have more pain or swelling after the cast or splint is put on.    When should I call my doctor?     You have a fever.      There is a foul smell or blood coming from under the cast.      You have questions or concerns about your condition or care.      Wrist Splint, Adult  A wrist splint is a device that prevents your wrist from moving. A splint supports your wrist like a cast, but it is more flexible. It can be removed or loosened. The supporting part of a splint does not completely surround your wrist. It is held in place with an elastic band or straps.  You may need a wrist splint if you have hurt your wrist or if you have a condition that causes swelling. Depending on the type of wrist problem you have, your splint may extend up your arm, onto your hand, or around your thumb. The wrist splint may be worn to:  Support your wrist.Protect your injury.Prevent further injury.Prevent movement.Reduce pain.Help with healing.It is important to follow instructions from your health care provider about when to wear the splint to make sure your wrist heals correctly.  What are the risks?  The most dangerous complication of wearing a splint is having a reduced blood supply to your wrist or hand. This can happen if there is a lot of swelling or if the splint is too tight. Limited blood supply results in a condition called compartment syndrome and can cause permanent damage. Symptoms include:  Pain that is getting worse.Tingling and numbness.Changes in skin color, including paleness or a bluish color.Cold fingers.Other complications of wearing a splint can include:  Skin irritation that can cause:  Itching.Rash.Skin sores.Skin infection.Wrist stiffness. This can occur if you have worn a splint for a long time.Wrist weakness.How to use your wrist splint  A wrist with a fitted splint.   Your wrist splint should be tight enough to support your wrist without blocking your blood supply. How long you need to wear the splint depends on the type of wrist problem you have. Your health care provider will instruct you about how to wear your wrist splint and how long to wear it.  Splint wear     Wear the splint as told by your health care provider. Remove it only as told by your health care provider.Loosen the splint if your fingers tingle, become numb, or turn cold and blue.Keep the splint clean.If the splint is not waterproof:  Do not let it get wet.Cover it with a watertight covering when you take a bath or a shower.Do not stick anything inside the splint to scratch your skin. Doing that increases your risk of infection.Check the skin under the splint for any redness or blisters every time you take off the splint. Tell your health care provider about any concerns.Managing pain, stiffness, and swelling     If directed, put ice on the injured area.  If you a have a removable splint, remove it as told by your health care provider.Put ice in a plastic bag.Place a towel between your skin and the bag.Leave the ice on for 20 minutes, 2–3 times a day.Move your fingers often to avoid stiffness and to lessen swelling.Raise (elevate) the injured area above the level of your heart while you are sitting or lying down.Activity     Return to your normal activities as told by your health care provider. Ask your health care provider what activities are safe for you.Do exercises as told by your health care provider.Ask your health care provider when it is safe to drive with a splint on your wrist.General instructions     Do not use the injured limb to support (bear) your body weight until your health care provider says that you can.Do not put pressure on any part of the splint until it is fully hardened. This may take several hours.Do not use any products that contain nicotine or tobacco, such as cigarettes and e-cigarettes. If you need help quitting, ask your health care provider.Take over-the-counter and prescription medicines only as told by your health care provider.Keep all follow-up visits as told by your health care provider. This is important.Contact a health care provider if:  You have wrist pain or swelling that does not go away.The skin around or under your splint becomes red, itchy, or moist.You have chills or a fever.Your splint feels too tight or too loose.Your splint gets damaged.Get help right away if:  You have pain that is getting worse.You have tingling and numbness.You have changes in skin color, including paleness or a bluish color.Your fingers are cold.Summary  A wrist splint is a flexible device that supports your wrist and prevents it from moving.Follow instructions from your health care provider about when to wear the splint to make sure your wrist heals correctly.Icing, moving your fingers, and raising (elevating) your wrist above the level of your heart will help you manage pain, stiffness, and swelling.The most dangerous complication of wearing a splint is having a reduced blood supply to your wrist or hand. If your fingers tingle, become numb, or turn cold

## 2020-07-10 NOTE — ED PROVIDER NOTE - PHYSICAL EXAMINATION
PHYSICAL EXAM: I have reviewed current vital signs.  GENERAL: NAD, well-nourished; well-developed.  HEAD:  Normocephalic, atraumatic.  EYES: Conjunctiva and sclera clear.  ENT: MMM.  NECK: Supple, FROM, no midline TTP.  CHEST/LUNG: Clear to auscultation bilaterally; no wheezes, rales, or rhonchi.  HEART: Regular rate and rhythm, normal S1 and S2; no murmurs, rubs, or gallops.  ABDOMEN: Soft, nontender, nondistended.  EXTREMITIES:  2+ peripheral pulses; LUE- +deformity, NV intact, +distal swelling, good ROM of digits/elbow/shoulder.  MSK: Hips/pelvis stable, no midline spinal TTP.  NEUROLOGY: A&O x 3. Motor 5/5. No focal neurological deficits.   SKIN: Warm and dry.

## 2020-07-11 LAB
ALBUMIN SERPL ELPH-MCNC: 4 G/DL — SIGNIFICANT CHANGE UP (ref 3.5–5.2)
ALP SERPL-CCNC: 83 U/L — SIGNIFICANT CHANGE UP (ref 30–115)
ALT FLD-CCNC: 10 U/L — SIGNIFICANT CHANGE UP (ref 0–41)
ANION GAP SERPL CALC-SCNC: 12 MMOL/L — SIGNIFICANT CHANGE UP (ref 7–14)
AST SERPL-CCNC: 15 U/L — SIGNIFICANT CHANGE UP (ref 0–41)
BASOPHILS # BLD AUTO: 0.05 K/UL — SIGNIFICANT CHANGE UP (ref 0–0.2)
BASOPHILS NFR BLD AUTO: 0.5 % — SIGNIFICANT CHANGE UP (ref 0–1)
BILIRUB SERPL-MCNC: 0.7 MG/DL — SIGNIFICANT CHANGE UP (ref 0.2–1.2)
BUN SERPL-MCNC: 11 MG/DL — SIGNIFICANT CHANGE UP (ref 10–20)
CALCIUM SERPL-MCNC: 8.9 MG/DL — SIGNIFICANT CHANGE UP (ref 8.5–10.1)
CHLORIDE SERPL-SCNC: 103 MMOL/L — SIGNIFICANT CHANGE UP (ref 98–110)
CO2 SERPL-SCNC: 27 MMOL/L — SIGNIFICANT CHANGE UP (ref 17–32)
CREAT SERPL-MCNC: 0.8 MG/DL — SIGNIFICANT CHANGE UP (ref 0.7–1.5)
EOSINOPHIL # BLD AUTO: 0.03 K/UL — SIGNIFICANT CHANGE UP (ref 0–0.7)
EOSINOPHIL NFR BLD AUTO: 0.3 % — SIGNIFICANT CHANGE UP (ref 0–8)
GLUCOSE SERPL-MCNC: 103 MG/DL — HIGH (ref 70–99)
HCT VFR BLD CALC: 40.4 % — SIGNIFICANT CHANGE UP (ref 37–47)
HGB BLD-MCNC: 13.4 G/DL — SIGNIFICANT CHANGE UP (ref 12–16)
IMM GRANULOCYTES NFR BLD AUTO: 0.4 % — HIGH (ref 0.1–0.3)
LYMPHOCYTES # BLD AUTO: 1.92 K/UL — SIGNIFICANT CHANGE UP (ref 1.2–3.4)
LYMPHOCYTES # BLD AUTO: 18.4 % — LOW (ref 20.5–51.1)
MAGNESIUM SERPL-MCNC: 2.1 MG/DL — SIGNIFICANT CHANGE UP (ref 1.8–2.4)
MCHC RBC-ENTMCNC: 32.4 PG — HIGH (ref 27–31)
MCHC RBC-ENTMCNC: 33.2 G/DL — SIGNIFICANT CHANGE UP (ref 32–37)
MCV RBC AUTO: 97.8 FL — SIGNIFICANT CHANGE UP (ref 81–99)
MONOCYTES # BLD AUTO: 0.8 K/UL — HIGH (ref 0.1–0.6)
MONOCYTES NFR BLD AUTO: 7.7 % — SIGNIFICANT CHANGE UP (ref 1.7–9.3)
NEUTROPHILS # BLD AUTO: 7.61 K/UL — HIGH (ref 1.4–6.5)
NEUTROPHILS NFR BLD AUTO: 72.7 % — SIGNIFICANT CHANGE UP (ref 42.2–75.2)
NRBC # BLD: 0 /100 WBCS — SIGNIFICANT CHANGE UP (ref 0–0)
PLATELET # BLD AUTO: 267 K/UL — SIGNIFICANT CHANGE UP (ref 130–400)
POTASSIUM SERPL-MCNC: 4 MMOL/L — SIGNIFICANT CHANGE UP (ref 3.5–5)
POTASSIUM SERPL-SCNC: 4 MMOL/L — SIGNIFICANT CHANGE UP (ref 3.5–5)
PROT SERPL-MCNC: 7.1 G/DL — SIGNIFICANT CHANGE UP (ref 6–8)
RBC # BLD: 4.13 M/UL — LOW (ref 4.2–5.4)
RBC # FLD: 12.6 % — SIGNIFICANT CHANGE UP (ref 11.5–14.5)
SARS-COV-2 RNA SPEC QL NAA+PROBE: SIGNIFICANT CHANGE UP
SODIUM SERPL-SCNC: 142 MMOL/L — SIGNIFICANT CHANGE UP (ref 135–146)
WBC # BLD: 10.45 K/UL — SIGNIFICANT CHANGE UP (ref 4.8–10.8)
WBC # FLD AUTO: 10.45 K/UL — SIGNIFICANT CHANGE UP (ref 4.8–10.8)

## 2020-07-11 PROCEDURE — 73700 CT LOWER EXTREMITY W/O DYE: CPT | Mod: 26,LT

## 2020-07-11 PROCEDURE — 73110 X-RAY EXAM OF WRIST: CPT | Mod: 26,LT

## 2020-07-11 PROCEDURE — 73700 CT LOWER EXTREMITY W/O DYE: CPT | Mod: 26,LT,77

## 2020-07-11 PROCEDURE — 99223 1ST HOSP IP/OBS HIGH 75: CPT | Mod: AI

## 2020-07-11 PROCEDURE — 72148 MRI LUMBAR SPINE W/O DYE: CPT | Mod: 26

## 2020-07-11 RX ORDER — METHOCARBAMOL 500 MG/1
750 TABLET, FILM COATED ORAL EVERY 8 HOURS
Refills: 0 | Status: DISCONTINUED | OUTPATIENT
Start: 2020-07-11 | End: 2020-07-11

## 2020-07-11 RX ORDER — ENOXAPARIN SODIUM 100 MG/ML
30 INJECTION SUBCUTANEOUS AT BEDTIME
Refills: 0 | Status: DISCONTINUED | OUTPATIENT
Start: 2020-07-11 | End: 2020-07-11

## 2020-07-11 RX ORDER — IBUPROFEN 200 MG
400 TABLET ORAL EVERY 6 HOURS
Refills: 0 | Status: DISCONTINUED | OUTPATIENT
Start: 2020-07-11 | End: 2020-07-13

## 2020-07-11 RX ORDER — SENNA PLUS 8.6 MG/1
2 TABLET ORAL AT BEDTIME
Refills: 0 | Status: DISCONTINUED | OUTPATIENT
Start: 2020-07-11 | End: 2020-07-15

## 2020-07-11 RX ORDER — TRAMADOL HYDROCHLORIDE 50 MG/1
50 TABLET ORAL EVERY 8 HOURS
Refills: 0 | Status: DISCONTINUED | OUTPATIENT
Start: 2020-07-11 | End: 2020-07-13

## 2020-07-11 RX ORDER — ACETAMINOPHEN 500 MG
650 TABLET ORAL ONCE
Refills: 0 | Status: COMPLETED | OUTPATIENT
Start: 2020-07-11 | End: 2020-07-11

## 2020-07-11 RX ORDER — ENOXAPARIN SODIUM 100 MG/ML
40 INJECTION SUBCUTANEOUS AT BEDTIME
Refills: 0 | Status: DISCONTINUED | OUTPATIENT
Start: 2020-07-11 | End: 2020-07-15

## 2020-07-11 RX ORDER — METHOCARBAMOL 500 MG/1
750 TABLET, FILM COATED ORAL EVERY 8 HOURS
Refills: 0 | Status: DISCONTINUED | OUTPATIENT
Start: 2020-07-11 | End: 2020-07-15

## 2020-07-11 RX ORDER — PANTOPRAZOLE SODIUM 20 MG/1
40 TABLET, DELAYED RELEASE ORAL DAILY
Refills: 0 | Status: DISCONTINUED | OUTPATIENT
Start: 2020-07-11 | End: 2020-07-15

## 2020-07-11 RX ORDER — OXYCODONE HYDROCHLORIDE 5 MG/1
5 TABLET ORAL EVERY 6 HOURS
Refills: 0 | Status: DISCONTINUED | OUTPATIENT
Start: 2020-07-11 | End: 2020-07-13

## 2020-07-11 RX ADMIN — PANTOPRAZOLE SODIUM 40 MILLIGRAM(S): 20 TABLET, DELAYED RELEASE ORAL at 18:24

## 2020-07-11 RX ADMIN — Medication 650 MILLIGRAM(S): at 10:46

## 2020-07-11 RX ADMIN — METHOCARBAMOL 750 MILLIGRAM(S): 500 TABLET, FILM COATED ORAL at 22:32

## 2020-07-11 RX ADMIN — ENOXAPARIN SODIUM 40 MILLIGRAM(S): 100 INJECTION SUBCUTANEOUS at 22:32

## 2020-07-11 NOTE — H&P ADULT - NSHPREVIEWOFSYSTEMS_GEN_ALL_CORE
•	CONSTITUTIONAL - No fever, No diaphoresis, No weight change  •	SKIN - No rash  •	HEMATOLOGIC - No abnormal bleeding or bruising  •	EYES - No eye pain, No blurred vision  •	ENT - No change in hearing, No sore throat, No neck pain, No rhinorrhea, No ear pain  •	RESPIRATORY - No shortness of breath, No cough  •	CARDIAC -No chest pain, No palpitations  •	GI - No abdominal pain, No nausea, No vomiting, No diarrhea, No constipation, No bright red blood per rectum or melena. No flank pain  •             - No dysuria, frequency, hematuria. occasional incontinence even prior to this fall   •	ENDO - No polydypsia, No polyuria, No heat/cold intolerance  •	MUSCULOSKELETAL - wrist pain lt, hip pain left  •	NEUROLOGIC - focal weakness LLE  All other systems negative, unless specified in HPI

## 2020-07-11 NOTE — ED ADULT NURSE NOTE - OBJECTIVE STATEMENT
pt state she slipped on the water from the window inside the house, pt states she is having pain in her left arm, tailbone and left leg. pt sent for x ray and was to be d/c if able to ambulate. pt was unable to ambulate with assistance/ admitted to medicine.

## 2020-07-11 NOTE — H&P ADULT - NSHPPHYSICALEXAM_GEN_ALL_CORE
VITAL SIGNS: AFebrile, vital signs stable  CONSTITUTIONAL: Well-developed; well-nourished; in no acute distress.  SKIN: Skin exam is warm and dry, no acute rash.  HEAD: Normocephalic; atraumatic.  EYES: Pupils equal round reactive to light,   NECK: Supple; non tender. No rigidity  CARD: Regular rate and rhythm. Normal S1, S2; no murmurs, gallops, or rubs.  RESP: Lungs clear to auscultation bilaterally. No wheezes, rales or rhonchi.  ABD: Abdomen soft; non-tender; non-distended;    EXT: left reduction site noted, cast in place.   NEURO: Alert and oriented x 3. LLE MS 4/5, MS wnl RLE/RUE. LUE MS not assessed because of cast. SLR + on left. No paraspinal tenderness  PSYCH: Cooperative, appropriate, pleasant to speak with

## 2020-07-11 NOTE — H&P ADULT - NSHPLABSRESULTS_GEN_ALL_CORE
< from: CT Hip No Cont, Left (07.11.20 @ 05:58) >    EXAM:  CT HIP ONLY LT            PROCEDURE DATE:  07/11/2020            INTERPRETATION:  CLINICAL HISTORY / REASON FOR EXAM: Left hip pain, inability to ambulate    CT OF THE LEFT HIP WITHOUT CONTRAST    TECHNIQUE: Images were obtained of the left hip without intravenous contrast. Coronal and sagittal reformatted images were also provided.    CORRELATION: Radiographs of the pelvis from July 10, 2020    FINDINGS:    BONES/JOINTS: No acute fractures of the left acetabulum, femoral head or proximal femur. The hip joint is appropriately aligned. No joint effusion. There are mild bony productive changes of the superior acetabulum. Greater trochanteric bony productive changes are noted as well.    SOFT TISSUES: No subcutaneous emphysema. Multipleprominent but not enlarged lymph nodes in the left groin. Trace calcifications within the left femoral artery.    Sigmoid diverticulosis      IMPRESSION:      No acute fractures of the left hip.    < end of copied text >      < from: Xray Wrist 3 Views, Left (07.10.20 @ 21:38) >      EXAM:  XR HAND MIN 3 VIEWS LT        EXAM:  XR WRIST COMP MIN 3 VIEWS LT        EXAM:  XR FOREARM 2 VIEWS LT        PROCEDURE DATE:  07/10/2020    INTERPRETATION:  CLINICAL INDICATION: fall  TECHNIQUE: Frontal and lateral radiographs of the left forearm were obtained. Frontal, lateral and oblique radiographs of the left wrist. Frontal, lateral and oblique radiographs of the left hand.  COMPARISON: None available.  INTERPRETATION/  IMPRESSION:   Mildly displaced comminuted intra-articular fracture of the distal radius. Displaced ulnar styloid fracture. The visualized joint spaces are maintained. Osteopenia is present.  TOMAS LUCIO M.D., ATTENDING RADIOLOGIST  This document has been electronically signed. Jul 11 2020  9:04AM  < end of copied text > LABS PENDING        < from: CT Hip No Cont, Left (07.11.20 @ 05:58) >  EXAM:  CT HIP ONLY LT        PROCEDURE DATE:  07/11/2020    INTERPRETATION:  CLINICAL HISTORY / REASON FOR EXAM: Left hip pain, inability to ambulate  CT OF THE LEFT HIP WITHOUT CONTRAST  TECHNIQUE: Images were obtained of the left hip without intravenous contrast. Coronal and sagittal reformatted images were also provided.  CORRELATION: Radiographs of the pelvis from July 10, 2020  FINDINGS:  BONES/JOINTS: No acute fractures of the left acetabulum, femoral head or proximal femur. The hip joint is appropriately aligned. No joint effusion. There are mild bony productive changes of the superior acetabulum. Greater trochanteric bony productive changes are noted as well.  SOFT TISSUES: No subcutaneous emphysema. Multipleprominent but not enlarged lymph nodes in the left groin. Trace calcifications within the left femoral artery.  Sigmoid diverticulosis  IMPRESSION:  No acute fractures of the left hip.  < end of copied text >      < from: Xray Wrist 3 Views, Left (07.10.20 @ 21:38) >  EXAM:  XR HAND MIN 3 VIEWS LT        EXAM:  XR WRIST COMP MIN 3 VIEWS LT        EXAM:  XR FOREARM 2 VIEWS LT        PROCEDURE DATE:  07/10/2020    INTERPRETATION:  CLINICAL INDICATION: fall  TECHNIQUE: Frontal and lateral radiographs of the left forearm were obtained. Frontal, lateral and oblique radiographs of the left wrist. Frontal, lateral and oblique radiographs of the left hand.  COMPARISON: None available.  INTERPRETATION/  IMPRESSION:   Mildly displaced comminuted intra-articular fracture of the distal radius. Displaced ulnar styloid fracture. The visualized joint spaces are maintained. Osteopenia is present.  TOMAS LUCIO M.D., ATTENDING RADIOLOGIST  This document has been electronically signed. Jul 11 2020  9:04AM  < end of copied text >

## 2020-07-11 NOTE — PHYSICAL THERAPY INITIAL EVALUATION ADULT - GENERAL OBSERVATIONS, REHAB EVAL
Pt encountered lying supine in a stretching bed in NAD, +cast Lt forearm due to radial fx(assume NWB). Pt c/o pain 7/10 Lt lat leg from hip down stating she also had h/o sciatic pain. Pt requires Min A in bed mobility and transfer mobility. Pt unable to ambulate at this time secondary Lt knee buckled attempting to bear weight on the Lt leg. PT anterior to pt.
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
## 2020-07-11 NOTE — CONSULT NOTE ADULT - ASSESSMENT
A&P:  73yo F w/ L distal radius fx s/p CR and splinting.   -pain control: tylenol, ibuprofen  -please keep cast c/d/i; please do not get wet   -please elevate LUE to reduce swelling  -please follow up with orthopaedic hand surgeon, Dr. Cassy Kendrick in 1 week at [address] [facility] ( [phone]) A&P:  73yo F w/ L distal radius fx s/p CR and splinting, with persistent L buttock pain with radiation to L knee, suspicious for sciatica given exam and history. Low clinical suspicion for LLE fracture at this time.  -pain control: tylenol, ibuprofen  -follow up CT read  -please keep cast c/d/i; please do not get wet   -please elevate LUE to reduce swelling  -please follow up with orthopaedic hand surgeon, Dr. Cassy Kendrick in 1 week at [address] [facility] ( [phone])

## 2020-07-11 NOTE — H&P ADULT - ASSESSMENT
74 year old female PMH of sciatica presents after fall. being admitted for decrease ambulatroy status 74 year old female PMH of sciatica presents after fall. being admitted for decrease ambulatroy status    #S/P Fall   --  pain control: morphine iv prn  --  dvt ppx, fall risk precautions  --  activity: bedrest / advance as tolerated  --  ortho eval  --  ptrehab eval  -pain control    #Low Back Pain- no red flag signs, likely diagnosis is sciatica     #History of Vulvar Ca  -outpatient followup    #Divertuclosis- noted incidetnally   -outpatient GI follow up    ________________________________________________________  Diet: regular  DVT ppx: lovenox 40mg subcu      ***********NOTE IS INCOMPLETE< PENDING PT ASSESSMENT>********** 74 year old female PMH of sciatica and vulva ca presents after fall resulting in fracture of left radius. Being admitted for inability to ambulate secondary to severe sciatic pain and weakness    #S/P Fall with left radial fracture of hand   -pain control: oxycodone for severe pain, tramadol for moderate pain  - dvt ppx, fall risk precautions  -activity advance as tolerated  - ortho following,follow up with orthopaedic hand surgeon, Dr. Cassy Kendrick in 1 week at [address] [facility] ( [phone])  -PT/Rehab eval  -bowel regimen    #Low Back Pain- no red flag signs, likely diagnosis is sciatica. CT imaging did not show acute hip fracture   -patient having history of incontinence has never endorses weakness this severe before. Will do MRI Lumbar spine    #History of Vulvar Ca  -outpatient followup    #Divertuclosis- noted incidentally on CT hip  -outpatient GI follow up    ________________________________________________________  Diet: regular  Activity; AAT  DVT ppx: lovenox 40mg subcu  Dispo: pending physiatry and PT eval; was from home and fully functional without need for assistive ambulatory device 74 year old female PMH of sciatica and vulva ca presents after fall resulting in fracture of left radius. Being admitted for inability to ambulate secondary to severe sciatic pain and weakness    #S/P Fall with left radial fracture of hand   -pain control: oxycodone for severe pain, tramadol for moderate pain  - dvt ppx, fall risk precautions  -activity advance as tolerated  - ortho following,follow up with orthopaedic hand surgeon, Dr. Cassy Kendrick in 1 week at [address] [facility] ( [phone])  -PT/Rehab eval  -bowel regimen    #Low Back Pain- no red flag signs, likely diagnosis is sciatica. CT imaging did not show acute hip fracture   -patient having history of incontinence has never endorses weakness this severe before. Will do MRI Lumbar spine  -0.5mg PO ativan for L-spine as patient gets claustrophobic    #History of Vulvar Ca  -outpatient followup    #Divertuclosis- noted incidentally on CT hip  -outpatient GI follow up    ________________________________________________________  Diet: regular  Activity; AAT  DVT ppx: lovenox 40mg subcu  Dispo: pending physiatry and PT eval; was from home and fully functional without need for assistive ambulatory device   FULLCODE

## 2020-07-11 NOTE — H&P ADULT - NSHPSOCIALHISTORY_GEN_ALL_CORE
Patient lives at home with her children. Patient was fully functional at time of fall. Patient denies illicit substance use and tobacco use, is a social drinker. Patient has been retired for >20 years.

## 2020-07-11 NOTE — CONSULT NOTE ADULT - SUBJECTIVE AND OBJECTIVE BOX
with ORTHOPAEDIC CONSULT NOTE    CC: L wrist pain    HPI:  75yo F w/ no significant pmhx, presenting w/ L wrist pain s/p fall. Patient states that she tripped and FOOSH earlier in day. XR revealed L distal radius fx. ED attempted reduction. Ortho consulted. During interview, patient states that her L wrist is painful. Also endorses pain in L hip. Denies pain elsewhere. Denies numbness/tingling. Denies fever, chills, cp/sob, nausea/vomiting.     ROS: negative other than reviewed above  PMHx:   -sciatica  -malignant neoplasm of vulva  -cataracts  PSHx:   -b/l cataract surgery  Meds: as per chart  Allergies: NKDA  Social: -etoh, -tobacco (former smoker), -illicit drugs     Vital Signs Last 24 Hrs  T(C): 37 (10 Jul 2020 19:47), Max: 37 (10 Jul 2020 19:47)  T(F): 98.6 (10 Jul 2020 19:47), Max: 98.6 (10 Jul 2020 19:47)  HR: 94 (10 Jul 2020 19:47) (94 - 94)  BP: 164/89 (10 Jul 2020 19:47) (164/89 - 164/89)  RR: 18 (10 Jul 2020 19:47) (18 - 18)  SpO2: 98% (10 Jul 2020 19:47) (98% - 98%)    Exam  General: elderly female lying in bed in NAD, pleasant and cooperative  HEENT: NCAT, EOMI  Resp: appropriate work of breathing on RA  Cards: normocardic  MSK  LUE  -skin intact; swelling on dorsum of wrist. no erythema, fluctuance, or warmth  -ttp distal radius/ulna  -can make composite fist  -AIN/PIN/ulnar nerves intact  -hand wwp, cap refill <2s    Images  XR L wrist: Colles fx of L distal radius     Procedures:  L wrist skin prepared with chloraprep. 7cc lidocaine injected into fx site. LUE hung with 12lb of traction on upper arm. Reduction attempted. Short arm cast applied. with ORTHOPAEDIC CONSULT NOTE    CC: L wrist pain    HPI:  73yo F w/ pmhx of sciatica presenting w/ L wrist pain s/p fall. Patient states that she tripped and FOOSH earlier in day. XR revealed L distal radius fx. ED attempted reduction. Ortho consulted. During interview, patient states that her L wrist is painful. Also endorses sharp shooting pain in L buttock with radiation to knee down posterior aspect of thigh. Was able to ambulate after fall. Does not typically ambulate with assistive devices. Denies pain elsewhere. Denies numbness/tingling. Denies fever, chills, cp/sob, nausea/vomiting.     Per ED, when attempting to discharge patient, she was unable to ambulate due to pain in LLE. Was mildly tender along L femur during exam however when distracted, did not elicit as strong a reaction. XR Pelvis negative for L hip fx. CT hip ordered by ED.    ROS: negative other than reviewed above  PMHx:   -sciatica  -malignant neoplasm of vulva  -cataracts  PSHx:   -b/l cataract surgery  Meds: as per chart  Allergies: NKDA  Social: -etoh, -tobacco (former smoker), -illicit drugs     Vital Signs Last 24 Hrs  T(C): 37 (10 Jul 2020 19:47), Max: 37 (10 Jul 2020 19:47)  T(F): 98.6 (10 Jul 2020 19:47), Max: 98.6 (10 Jul 2020 19:47)  HR: 94 (10 Jul 2020 19:47) (94 - 94)  BP: 164/89 (10 Jul 2020 19:47) (164/89 - 164/89)  RR: 18 (10 Jul 2020 19:47) (18 - 18)  SpO2: 98% (10 Jul 2020 19:47) (98% - 98%)    Exam  General: elderly female lying in bed in NAD, pleasant and cooperative  HEENT: NCAT, EOMI  Resp: appropriate work of breathing on RA  Cards: normocardic  MSK  LUE  -skin intact; swelling on dorsum of wrist. no erythema, fluctuance, or warmth  -ttp distal radius/ulna  -can make composite fist  -AIN/PIN/ulnar nerves intact  -hand wwp, cap refill <2s  LLE  -skin intact, no ecchymosis, erythema, effusions appreciated  -mildly ttp along femur, apparent diminished tenderness when distracted   -FROM knee, ankle   -5/5 strength quads/hamstrings, gastroc/tib ant, ehl/fhl  -SILT distally  -foot wwp, strong DP pulse palpated     Images  XR L wrist: Colles fx of L distal radius     XR Pelvis: negative for fx or dx       Procedures:  L wrist skin prepared with chloraprep. 7cc lidocaine injected into fx site. LUE hung with 12lb of traction on upper arm. Reduction attempted. Short arm cast applied.
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
## 2020-07-12 LAB
ALBUMIN SERPL ELPH-MCNC: 3.7 G/DL — SIGNIFICANT CHANGE UP (ref 3.5–5.2)
ALP SERPL-CCNC: 80 U/L — SIGNIFICANT CHANGE UP (ref 30–115)
ALT FLD-CCNC: 8 U/L — SIGNIFICANT CHANGE UP (ref 0–41)
ANION GAP SERPL CALC-SCNC: 12 MMOL/L — SIGNIFICANT CHANGE UP (ref 7–14)
AST SERPL-CCNC: 14 U/L — SIGNIFICANT CHANGE UP (ref 0–41)
BASOPHILS # BLD AUTO: 0.05 K/UL — SIGNIFICANT CHANGE UP (ref 0–0.2)
BASOPHILS NFR BLD AUTO: 0.5 % — SIGNIFICANT CHANGE UP (ref 0–1)
BILIRUB SERPL-MCNC: 0.7 MG/DL — SIGNIFICANT CHANGE UP (ref 0.2–1.2)
BUN SERPL-MCNC: 10 MG/DL — SIGNIFICANT CHANGE UP (ref 10–20)
CALCIUM SERPL-MCNC: 8.8 MG/DL — SIGNIFICANT CHANGE UP (ref 8.5–10.1)
CHLORIDE SERPL-SCNC: 103 MMOL/L — SIGNIFICANT CHANGE UP (ref 98–110)
CO2 SERPL-SCNC: 23 MMOL/L — SIGNIFICANT CHANGE UP (ref 17–32)
CREAT SERPL-MCNC: 0.7 MG/DL — SIGNIFICANT CHANGE UP (ref 0.7–1.5)
EOSINOPHIL # BLD AUTO: 0.06 K/UL — SIGNIFICANT CHANGE UP (ref 0–0.7)
EOSINOPHIL NFR BLD AUTO: 0.6 % — SIGNIFICANT CHANGE UP (ref 0–8)
GLUCOSE SERPL-MCNC: 96 MG/DL — SIGNIFICANT CHANGE UP (ref 70–99)
HCT VFR BLD CALC: 39.9 % — SIGNIFICANT CHANGE UP (ref 37–47)
HGB BLD-MCNC: 13.5 G/DL — SIGNIFICANT CHANGE UP (ref 12–16)
IMM GRANULOCYTES NFR BLD AUTO: 0.4 % — HIGH (ref 0.1–0.3)
LYMPHOCYTES # BLD AUTO: 2.09 K/UL — SIGNIFICANT CHANGE UP (ref 1.2–3.4)
LYMPHOCYTES # BLD AUTO: 21.4 % — SIGNIFICANT CHANGE UP (ref 20.5–51.1)
MAGNESIUM SERPL-MCNC: 2 MG/DL — SIGNIFICANT CHANGE UP (ref 1.8–2.4)
MCHC RBC-ENTMCNC: 32.5 PG — HIGH (ref 27–31)
MCHC RBC-ENTMCNC: 33.8 G/DL — SIGNIFICANT CHANGE UP (ref 32–37)
MCV RBC AUTO: 96.1 FL — SIGNIFICANT CHANGE UP (ref 81–99)
MONOCYTES # BLD AUTO: 0.78 K/UL — HIGH (ref 0.1–0.6)
MONOCYTES NFR BLD AUTO: 8 % — SIGNIFICANT CHANGE UP (ref 1.7–9.3)
NEUTROPHILS # BLD AUTO: 6.75 K/UL — HIGH (ref 1.4–6.5)
NEUTROPHILS NFR BLD AUTO: 69.1 % — SIGNIFICANT CHANGE UP (ref 42.2–75.2)
NRBC # BLD: 0 /100 WBCS — SIGNIFICANT CHANGE UP (ref 0–0)
PHOSPHATE SERPL-MCNC: 3.2 MG/DL — SIGNIFICANT CHANGE UP (ref 2.1–4.9)
PLATELET # BLD AUTO: 248 K/UL — SIGNIFICANT CHANGE UP (ref 130–400)
POTASSIUM SERPL-MCNC: 3.7 MMOL/L — SIGNIFICANT CHANGE UP (ref 3.5–5)
POTASSIUM SERPL-SCNC: 3.7 MMOL/L — SIGNIFICANT CHANGE UP (ref 3.5–5)
PROT SERPL-MCNC: 6.8 G/DL — SIGNIFICANT CHANGE UP (ref 6–8)
RBC # BLD: 4.15 M/UL — LOW (ref 4.2–5.4)
RBC # FLD: 12.4 % — SIGNIFICANT CHANGE UP (ref 11.5–14.5)
SODIUM SERPL-SCNC: 138 MMOL/L — SIGNIFICANT CHANGE UP (ref 135–146)
WBC # BLD: 9.77 K/UL — SIGNIFICANT CHANGE UP (ref 4.8–10.8)
WBC # FLD AUTO: 9.77 K/UL — SIGNIFICANT CHANGE UP (ref 4.8–10.8)

## 2020-07-12 PROCEDURE — 99233 SBSQ HOSP IP/OBS HIGH 50: CPT
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
## 2020-07-12 NOTE — PROGRESS NOTE ADULT - SUBJECTIVE AND OBJECTIVE BOX
pt s&e  imaging reviewed  nondisplaced lateral plateau frx  nwb  knee immobilzer, will convert to fracture brace.

## 2020-07-12 NOTE — CONSULT NOTE ADULT - ASSESSMENT
IMPRESSION: Rehab of gait abnl, left tibial plat fx, left wrist fx, history of Vit D def and prior fxs     PRECAUTIONS: [  ] Cardiac  [  ] Respiratory  [  ] Seizures [  ] Contact Isolation  [  ] Droplet Isolation  [  ] Other    Weight Bearing Status: NWB LUE and LUE; left platform walker to bypass wrist fx; left knee immob or fracture brace    RECOMMENDATION:  I'll add Vit D3 2,00 units daily.    Out of Bed to Chair     DVT/Decubiti Prophylaxis    REHAB PLAN:     [ x  ] Bedside P/T 3-5 times a week   [   ]   Bedside O/T  2-3 times a week             [   ] No Rehab Therapy Indicated                   [   ]  Speech Therapy   Conditioning/ROM                                    ADL  Bed Mobility                                               Conditioning/ROM  Transfers                                                     Bed Mobility  Sitting /Standing Balance                         Transfers                                        Gait Training                                               Sitting/Standing Balance  Stair Training [   ]Applicable                    Home equipment Eval                                                                        Splinting  [   ] Only      GOALS:   ADL   [ x  ]   Independent                    Transfers  [  x ] Independent                          Ambulation  [x   ] Independent     [ x   ] With device                            [   ]  CG                                                         [   ]  CG                                                                  [   ] CG                            [    ] Min A                                                   [   ] Min A                                                              [   ] Min  A          DISCHARGE PLAN:   [   ]  Good candidate for Intensive Rehabilitation/Hospital based-4A [facility]                                             Will tolerate 3hrs Intensive Rehab Daily                                       [ xx   ]  Short Term Rehab in Skilled Nursing Facility                                       [    ]  Home with Outpatient or VN services                                         [    ]  Possible Candidate for Intensive Hospital based Rehab
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
## 2020-07-13 PROCEDURE — 99233 SBSQ HOSP IP/OBS HIGH 50: CPT

## 2020-07-13 RX ADMIN — Medication 10 MILLIGRAM(S): at 21:40

## 2020-07-13 NOTE — PROGRESS NOTE ADULT - SUBJECTIVE AND OBJECTIVE BOX
pt seen at bedside left distal radius fx in cast   left tibial plateau fx in knee immobilizer     pt seen at5 bedside no complaints     left hand in cast able to move fingers slight swelling present     left lower ext: pt in knee immobilizer   nvid

## 2020-07-14 PROCEDURE — 99232 SBSQ HOSP IP/OBS MODERATE 35: CPT
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
## 2020-07-14 NOTE — PROGRESS NOTE ADULT - SUBJECTIVE AND OBJECTIVE BOX
HPI  Patient is a 74y old Female who presents with a chief complaint of Inability to ambulate (13 Jul 2020 13:05)    Currently admitted to medicine with the primary diagnosis of Radius fracture     Today is hospital day 3d.     INTERVAL HPI / OVERNIGHT EVENTS:  Patient was examined and seen at bedside. This morning she is resting comfortably in bed and reports no new issues or overnight events. Denies pain.    ROS: Otherwise unremarkable     PAST MEDICAL & SURGICAL HISTORY  Malignant neoplasm of vulva  Cataract: h/o b/l    ALLERGIES  No Known Allergies    MEDICATIONS  STANDING MEDICATIONS  cholecalciferol 2000 Unit(s) Oral at bedtime  enoxaparin Injectable 40 milliGRAM(s) SubCutaneous at bedtime  methocarbamol 750 milliGRAM(s) Oral every 8 hours  pantoprazole   Suspension 40 milliGRAM(s) Oral daily    PRN MEDICATIONS  acetaminophen   Tablet .. 650 milliGRAM(s) Oral every 6 hours PRN  ibuprofen  Tablet. 400 milliGRAM(s) Oral every 8 hours PRN  senna 2 Tablet(s) Oral at bedtime PRN    VITALS:  T(F): 97  HR: 59  BP: 152/75  RR: 18  SpO2: --    PHYSICAL EXAM  GEN: NAD, Resting comfortably in bed  PULM: Clear to auscultation bilaterally, No wheezes  CVS: Regular rate and rhythm, S1-S2, no murmurs  ABD: Soft, non-tender, non-distended, no guarding  EXT: No edema. Cast in place over LUE, LLE in immobilizer  NEURO: AAOx3, no focal deficits    LABS        RADIOLOGY      Assessment and Plan:   · Assessment		  74 year old female PMH of sciatica and vulva ca presents after fall resulting in fracture of left radius. Being admitted for inability to ambulate secondary to severe sciatic pain and weakness    #Left radial fracture and proximal tibial plateau due to mechanical fall  - s/p reduction and casting for radial fracture by ortho   - immobilized left knee   - motrin 400 mg PO Q8H, tylenol 650 mg PO Q8H prn for pain  - Ortho following, appreciate recs  - follow up with orthopaedic hand surgeon, Dr. Cassy Kendrick in 1 week at [address] [facility] ( [phone])  - PT/Rehab eval, appreciate recs  - discharge pending covid swab    #Low Back Pain  MR lumbar spine with disc degeneration and L3-4. L4-5 disc protrusion  No evidence of cord compression  Continue Robaxin 750mg q8  Continue PT      #History of Vulvar Ca  outpatient followup    #Divertuclosis  noted incidentally on CT hip  outpatient GI follow up    #Diet: regular  #Activity; AAT  #DVT ppx: lovenox 40mg subcu  #Dispo: STR, case management aware   #FULL CODE

## 2020-07-14 NOTE — OCCUPATIONAL THERAPY INITIAL EVALUATION ADULT - GENERAL OBSERVATIONS, REHAB EVAL
Pt received semi-perez in bed.+ IV lock,+ Primafit. Tx:[id number], [id number] Pt chart thoroughly reviewed prior to OT evaluation

## 2020-07-14 NOTE — PROGRESS NOTE ADULT - ATTENDING COMMENTS
Agree with resident's note, HPI, PE, assessment and plan.  Pt was seen and examined independently.     Pt feels fine, pain is controlled.     # S/P Fall with left radial fracture of hand   - pain control: oxycodone for severe pain, tramadol for moderate pain  - dvt ppx, fall risk precautions  - NWB on LUE  - ortho following, follow up with orthopaedic hand surgeon, Dr. Cassy Kendrick in 1 week at [address] [facility] ( [phone])  - PT/Rehab eval  - bowel regimen    # Linear lucency involving the lateral tibial plateau, which may represent a nondisplaced fracture.  - CT knee Acute posterolateral tibial plateau fracture with 3 mm plateau depression (Schatzker type III).  Large knee joint effusion with lipohemarthrosis.  - pt has immobilizer, ortho on board:  knee immobilizer, will convert to fracture brace.  - NWB on LLE    # Low Back Pain- no red flag signs, likely diagnosis is sciatica. CT imaging did not show acute hip fracture   -patient having history of chronic incontinence has never endorses weakness this severe before.   - MRI spine:  Degenerative changes in the lumbar spine as described above, with a right foraminal disc protrusion at L4-5 and left foraminal disc protrusion at L3-4. No high-grade central canal stenosis in the lumbar spine. Will do MRI Lumbar spine if no improvement w/ muscle relaxants and NSAIDs    # History of Vulvar Ca  -outpatient followup    # Diverticulosis- noted incidentally on CT hip, no clinical complaints  -outpatient GI follow up    DVT ppx  DC planning to STR  f/u COVID PCR.     Anticipated DC tomorrow
Agree with resident's note, HPI, PE, assessment and plan.  Pt was seen and examined independently in AM, pt was sitting comfortably in a chair. She states pain controlled.     #S/P Fall with left radial fracture of hand   - pain control: oxycodone for severe pain, tramadol for moderate pain  - dvt ppx, fall risk precautions  -activity advance as tolerated  - ortho following, follow up with orthopaedic hand surgeon, Dr. Cassy Kendrick in 1 week at [address] [facility] ( [phone])  - PT/Rehab eval  - bowel regimen    # Linear lucency involving the lateral tibial plateau, which may represent a nondisplaced fracture.  - CT knee Acute posterolateral tibial plateau fracture with 3 mm plateau depression (Schatzker type III).  Large knee joint effusion with lipohemarthrosis.  - pt has immobilizer, ortho on board: nwb, knee immobilizer, will convert to fracture brace.    # Low Back Pain- no red flag signs, likely diagnosis is sciatica. CT imaging did not show acute hip fracture   -patient having history of chronic incontinence has never endorses weakness this severe before.   - MRI spine:  Degenerative changes in the lumbar spine as described above, with a right foraminal disc protrusion at L4-5 and left foraminal disc protrusion at L3-4. No high-grade central canal stenosis in the lumbar spine. Will do MRI Lumbar spine if no improvement w/ muscle relaxants and NSAIDs    # History of Vulvar Ca  -outpatient followup    # Diverticulosis- noted incidentally on CT hip, no clinical complaints  -outpatient GI follow up    DVT ppx  DC planning to STR  COVID swab today

## 2020-07-15 ENCOUNTER — TRANSCRIPTION ENCOUNTER (OUTPATIENT)
Age: 75
End: 2020-07-15

## 2020-07-15 VITALS
RESPIRATION RATE: 18 BRPM | TEMPERATURE: 96 F | DIASTOLIC BLOOD PRESSURE: 75 MMHG | HEART RATE: 69 BPM | SYSTOLIC BLOOD PRESSURE: 147 MMHG

## 2020-07-15 DIAGNOSIS — K57.90 DIVERTICULOSIS OF INTESTINE, PART UNSPECIFIED, WITHOUT PERFORATION OR ABSCESS WITHOUT BLEEDING: ICD-10-CM

## 2020-07-15 LAB — SARS-COV-2 RNA SPEC QL NAA+PROBE: SIGNIFICANT CHANGE UP

## 2020-07-15 PROCEDURE — 99232 SBSQ HOSP IP/OBS MODERATE 35: CPT

## 2020-07-15 PROCEDURE — 73110 X-RAY EXAM OF WRIST: CPT | Mod: 26,LT

## 2020-07-15 RX ORDER — POLYETHYLENE GLYCOL 3350 17 G/17G
17 POWDER, FOR SOLUTION ORAL
Qty: 0 | Refills: 0 | DISCHARGE
Start: 2020-07-15

## 2020-07-15 RX ORDER — MORPHINE SULFATE 50 MG/1
2 CAPSULE, EXTENDED RELEASE ORAL EVERY 4 HOURS
Refills: 0 | Status: DISCONTINUED | OUTPATIENT
Start: 2020-07-15 | End: 2020-07-15

## 2020-07-15 RX ORDER — SENNA PLUS 8.6 MG/1
2 TABLET ORAL
Qty: 0 | Refills: 0 | DISCHARGE
Start: 2020-07-15

## 2020-07-15 RX ORDER — IBUPROFEN 200 MG
1 TABLET ORAL
Qty: 0 | Refills: 0 | DISCHARGE
Start: 2020-07-15

## 2020-07-15 RX ORDER — ACETAMINOPHEN 500 MG
2 TABLET ORAL
Qty: 0 | Refills: 0 | DISCHARGE
Start: 2020-07-15

## 2020-07-15 RX ORDER — POLYETHYLENE GLYCOL 3350 17 G/17G
17 POWDER, FOR SOLUTION ORAL ONCE
Refills: 0 | Status: COMPLETED | OUTPATIENT
Start: 2020-07-15 | End: 2020-07-15

## 2020-07-15 RX ORDER — CHOLECALCIFEROL (VITAMIN D3) 125 MCG
2000 CAPSULE ORAL
Qty: 0 | Refills: 0 | DISCHARGE
Start: 2020-07-15

## 2020-07-15 RX ADMIN — MORPHINE SULFATE 2 MILLIGRAM(S): 50 CAPSULE, EXTENDED RELEASE ORAL at 08:40

## 2020-07-15 RX ADMIN — MORPHINE SULFATE 2 MILLIGRAM(S): 50 CAPSULE, EXTENDED RELEASE ORAL at 08:20

## 2020-07-15 RX ADMIN — POLYETHYLENE GLYCOL 3350 17 GRAM(S): 17 POWDER, FOR SOLUTION ORAL at 12:54
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
## 2020-07-15 NOTE — DISCHARGE NOTE PROVIDER - CARE PROVIDERS DIRECT ADDRESSES
,DirectAddress_Unknown ,DirectAddress_Unknown,[email or web address]

## 2020-07-15 NOTE — DISCHARGE NOTE PROVIDER - PROVIDER TOKENS
FREE:[LAST:[tila],FIRST:[rajeev],PHONE:[phone]],FAX:[(   )    -],ADDRESS:[[address] [place] [address]],FOLLOWUP:[1 week]] FREE:[LAST:[tila],FIRST:[rajeev],PHONE:[phone]],FAX:[(   )    -],ADDRESS:[[address] [place] [address]],FOLLOWUP:[1 week]],PROVIDER:[TOKEN:[[id number]],FOLLOWUP:[1 week]]

## 2020-07-15 NOTE — PROGRESS NOTE ADULT - SUBJECTIVE AND OBJECTIVE BOX
ORTHOPEDIC PROGRESS NOTE    Seen and examined at bedside. Pain in LUE and LLE well controlled. [place] brace received and placed yesterday. No new complaints.    Vital Signs Last 24 Hrs  T(C): 35.8 (15 Jul 2020 07:35), Max: 36 (14 Jul 2020 15:00)  T(F): 96.5 (15 Jul 2020 07:35), Max: 96.8 (14 Jul 2020 15:00)  HR: 60 (15 Jul 2020 07:35) (60 - 70)  BP: 139/77 (15 Jul 2020 07:35) (130/74 - 139/77)  RR: 18 (15 Jul 2020 07:35) (18 - 19)    Exam  LUE  -cast in place c/d/i  -AIN/PIN/ulnar nerves intact  -fingers wwp, cap refill <2s    LLE  -bob brace in place  -compartments soft/compressible  -moving all toes  -SILT distally  -foot wwp, DP pulse palpable

## 2020-07-15 NOTE — DISCHARGE NOTE NURSING/CASE MANAGEMENT/SOCIAL WORK - PATIENT PORTAL LINK FT
You can access the FollowMyHealth Patient Portal offered by [facility] by registering at the following website: [email or web address]. By joining "[employer]’s FollowMyHealth portal, you will also be able to view your health information using other applications (apps) compatible with our system.

## 2020-07-15 NOTE — CHART NOTE - NSCHARTNOTEFT_GEN_A_CORE
<<<RESIDENT DISCHARGE NOTE>>>     MT TREVIÑO  MRN-[id number]    VITAL SIGNS:  T(F): 96.5 (07-15-20 @ 07:35), Max: 96.8 (07-14-20 @ 15:00)  HR: 60 (07-15-20 @ 07:35)  BP: 139/77 (07-15-20 @ 07:35)  SpO2: --      PHYSICAL EXAMINATION:  GEN: NAD, Resting comfortably in bed  PULM: Clear to auscultation bilaterally, No wheezes  CVS: Regular rate and rhythm, S1-S2, no murmurs  ABD: Soft, non-tender, non-distended, no guarding  EXT: No edema  NEURO: AAOx3, no focal deficits    TEST RESULTS:              Patient seen and evaluated at bedside. No overnight acute events. Patient is ready for discharge.    FINAL DISCHARGE INTERVIEW:  Resident(s) Present: Carmelo Leonard MD    DISPOSITION:   [  ] Home,    [  ] Home with Visiting Nursing Services,   [  x  ]  SNF/ NH,    [   ] Acute Rehab (4A),   [   ] Other (Specify:_________)

## 2020-07-15 NOTE — PROGRESS NOTE ADULT - SUBJECTIVE AND OBJECTIVE BOX
[place], [place]  74[id number]      Patient is a 74y old  Female who presents with a chief complaint of Inability to ambulate (15 Jul 2020 11:40)      INTERVAL HPI/OVERNIGHT EVENTS:    no acute events overnight     REVIEW OF SYSTEMS:  ROS neg  FAMILY HISTORY:  No pertinent family history in first degree relatives    T(C): 35.8 (07-15-20 @ 07:35), Max: 36 (07-14-20 @ 15:00)  HR: 60 (07-15-20 @ 07:35) (60 - 70)  BP: 139/77 (07-15-20 @ 07:35) (130/74 - 139/77)  RR: 18 (07-15-20 @ 07:35) (18 - 19)  SpO2: --  Wt(kg): --Vital Signs Last 24 Hrs  T(C): 35.8 (15 Jul 2020 07:35), Max: 36 (14 Jul 2020 15:00)  T(F): 96.5 (15 Jul 2020 07:35), Max: 96.8 (14 Jul 2020 15:00)  HR: 60 (15 Jul 2020 07:35) (60 - 70)  BP: 139/77 (15 Jul 2020 07:35) (130/74 - 139/77)  BP(mean): --  RR: 18 (15 Jul 2020 07:35) (18 - 19)  SpO2: --    PHYSICAL EXAM:  GENERAL: NAD, well-groomed, well-developed  HEAD:  Atraumatic, Normocephalic  EYES: EOMI, PERRLA, conjunctiva and sclera clear  ENMT: No tonsillar erythema, exudates, or enlargement; Moist mucous membranes, Good dentition, No lesions  NECK: Supple, No JVD, Normal thyroid  NERVOUS SYSTEM:  Alert & Oriented X3,   PULM: Clear to auscultation bilaterally  CARDIAC: Regular rate and rhythm; No murmurs, rubs, or gallops  GI: Soft, Nontender, Nondistended; Bowel sounds present  EXTREMITIES:  2+ Peripheral Pulses, No clubbing, cyanosis, or edema LUE and LLE immobilized   LYMPH: No lymphadenopathy noted  SKIN: No rashes or lesions                      acetaminophen   Tablet .. 650 milliGRAM(s) Oral every 6 hours PRN  cholecalciferol 2000 Unit(s) Oral at bedtime  enoxaparin Injectable 40 milliGRAM(s) SubCutaneous at bedtime  ibuprofen  Tablet. 400 milliGRAM(s) Oral every 8 hours PRN  methocarbamol 750 milliGRAM(s) Oral every 8 hours  morphine  - Injectable 2 milliGRAM(s) IV Push every 4 hours PRN  pantoprazole   Suspension 40 milliGRAM(s) Oral daily  senna 2 Tablet(s) Oral at bedtime PRN      HEALTH ISSUES - PROBLEM Dx:          Case Discussed with House Staff     Spectra [phone]

## 2020-07-15 NOTE — DISCHARGE NOTE PROVIDER - CARE PROVIDER_API CALL
rajeev adorno  [address] [place] [address]  Phone: [phone]  Fax: (   )    -  Follow Up Time: 1 week rajeev adorno  [address] [place] [address]  Phone: [phone]  Fax: (   )    -  Follow Up Time: 1 week    Brayden Sánchez  [address] [place] [id number]  Phone: [phone]  Fax: [phone]  Follow Up Time: 1 week

## 2020-07-15 NOTE — DISCHARGE NOTE PROVIDER - NSDCCPCAREPLAN_GEN_ALL_CORE_FT
PRINCIPAL DISCHARGE DIAGNOSIS  Diagnosis: Distal radius fracture, left  Assessment and Plan of Treatment: Due to your fall, you fractured youR left wrist bone and orthopedic surgery stabilized the joint and did not feel it was severe enough to operate. Make sure you follow up with your orthopedic surgeon in one week to assess recovery.      SECONDARY DISCHARGE DIAGNOSES  Diagnosis: Tibial plateau fracture, left  Assessment and Plan of Treatment: Due to your fall, you fractured your left knee bone and orthopedic surgery stabilized the joint and did not feel it was severe enough to operate. Make sure you keep the brace on and follow up with your orthopedic surgeon in one week to assess recovery. PRINCIPAL DISCHARGE DIAGNOSIS  Diagnosis: Distal radius fracture, left  Assessment and Plan of Treatment: Due to your fall, you fractured youR left wrist bone and orthopedic surgery stabilized the joint and did not feel it was severe enough to operate. Make sure you follow up with your orthopedic surgeon in one week to assess recovery. Also follow up with your primary care doctor in one week.      SECONDARY DISCHARGE DIAGNOSES  Diagnosis: Tibial plateau fracture, left  Assessment and Plan of Treatment: Due to your fall, you fractured your left knee bone and orthopedic surgery stabilized the joint and did not feel it was severe enough to operate. Make sure you keep the brace on and follow up with your orthopedic surgeon in one week to assess recovery. Also follow up with your primary care doctor in one week.
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
## 2020-07-15 NOTE — DISCHARGE NOTE PROVIDER - NSDCMRMEDTOKEN_GEN_ALL_CORE_FT
acetaminophen 325 mg oral tablet: 2 tab(s) orally every 6 hours, As needed, Mild Pain (1 - 3)  cholecalciferol oral tablet: 2000 unit(s) orally once a day (at bedtime)  ibuprofen 400 mg oral tablet: 1 tab(s) orally every 8 hours, As needed, Moderate Pain (4 - 6)  oxyCODONE 5 mg oral tablet: 1 tab(s) orally every 6 hours, As Needed - for severe pain MDD:4 tabs  senna oral tablet: 2 tab(s) orally once a day (at bedtime), As needed, Constipation acetaminophen 325 mg oral tablet: 2 tab(s) orally every 6 hours, As needed, Mild Pain (1 - 3)  cholecalciferol oral tablet: 2000 unit(s) orally once a day (at bedtime)  ibuprofen 400 mg oral tablet: 1 tab(s) orally every 8 hours, As needed, Moderate Pain (4 - 6)  oxyCODONE 5 mg oral tablet: 1 tab(s) orally every 6 hours, As Needed - for severe pain MDD:4 tabs  polyethylene glycol 3350 oral powder for reconstitution: 17 gram(s) orally once  senna oral tablet: 2 tab(s) orally once a day (at bedtime), As needed, Constipation

## 2020-07-15 NOTE — PROGRESS NOTE ADULT - REASON FOR ADMISSION
Inability to ambulate

## 2020-07-17 DIAGNOSIS — Z87.891 PERSONAL HISTORY OF NICOTINE DEPENDENCE: ICD-10-CM

## 2020-07-17 DIAGNOSIS — M54.30 SCIATICA, UNSPECIFIED SIDE: ICD-10-CM

## 2020-07-17 DIAGNOSIS — S52.532A COLLES' FRACTURE OF LEFT RADIUS, INITIAL ENCOUNTER FOR CLOSED FRACTURE: ICD-10-CM

## 2020-07-17 DIAGNOSIS — N39.41 URGE INCONTINENCE: ICD-10-CM

## 2020-07-17 DIAGNOSIS — S52.352A DISPLACED COMMINUTED FRACTURE OF SHAFT OF RADIUS, LEFT ARM, INITIAL ENCOUNTER FOR CLOSED FRACTURE: ICD-10-CM
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
## 2020-07-17 DIAGNOSIS — Z98.41 CATARACT EXTRACTION STATUS, RIGHT EYE: ICD-10-CM
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
## 2020-07-17 DIAGNOSIS — Z85.44 PERSONAL HISTORY OF MALIGNANT NEOPLASM OF OTHER FEMALE GENITAL ORGANS: ICD-10-CM

## 2020-07-17 DIAGNOSIS — Z98.42 CATARACT EXTRACTION STATUS, LEFT EYE: ICD-10-CM
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
## 2020-07-17 DIAGNOSIS — S82.145A NONDISPLACED BICONDYLAR FRACTURE OF LEFT TIBIA, INITIAL ENCOUNTER FOR CLOSED FRACTURE: ICD-10-CM

## 2020-07-17 DIAGNOSIS — N18.2 CHRONIC KIDNEY DISEASE, STAGE 2 (MILD): ICD-10-CM

## 2020-11-18 NOTE — PATIENT PROFILE ADULT - DISASTER - NS PRO AD NO ADVANCE DIRECTIVE
Counting Your Baby's Kicks: Care Instructions  Your Care Instructions     Counting your baby's kicks is one way your doctor can tell that your baby is healthy. Most women--especially in a first pregnancy--feel their baby move for the first time between 16 and 22 weeks. The movement may feel like flutters rather than kicks. Your baby may move more at certain times of the day. When you are active, you may notice less kicking than when you are resting. At your prenatal visits, your doctor will ask whether the baby is active. In your last trimester, your doctor may ask you to count the number of times you feel your baby move. Follow-up care is a key part of your treatment and safety. Be sure to make and go to all appointments, and call your doctor if you are having problems. It's also a good idea to know your test results and keep a list of the medicines you take. How do you count fetal kicks? · A common method of checking your baby's movement is to count the number of kicks or moves you feel in 1 hour. Ten movements (such as kicks, flutters, or rolls) in 1 hour are normal. Some doctors suggest that you count in the morning until you get to 10 movements. Then you can quit for that day and start again the next day. · Pick your baby's most active time of day to count. This may be any time from morning to evening. · If you do not feel 10 movements in an hour, your baby may be sleeping. Wait for the next hour and count again. When should you call for help? Call your doctor now or seek immediate medical care if:    · You noticed that your baby has stopped moving or is moving much less than normal.   Watch closely for changes in your health, and be sure to contact your doctor if you have any problems. Where can you learn more? Go to http://www.gray.com/  Enter [id number] in the search box to learn more about \"Counting Your Baby's Kicks: Care Instructions. \"  Current as of: February 11, 2020               Content Version: 12.6  © 1108-1095 [employer]. Care instructions adapted under license by [employer] (which disclaims liability or warranty for this information). If you have questions about a medical condition or this instruction, always ask your healthcare professional. [address] any warranty or liability for your use of this information. No

## 2021-04-14 ENCOUNTER — LABORATORY RESULT (OUTPATIENT)
Age: 76
End: 2021-04-14

## 2021-04-15 ENCOUNTER — APPOINTMENT (OUTPATIENT)
Dept: OBGYN | Facility: CLINIC | Age: 76
End: 2021-04-15
Payer: MEDICARE

## 2021-04-15 VITALS — SYSTOLIC BLOOD PRESSURE: 146 MMHG | BODY MASS INDEX: 27.81 KG/M2 | DIASTOLIC BLOOD PRESSURE: 88 MMHG | WEIGHT: 162 LBS

## 2021-04-15 DIAGNOSIS — N76.2 ACUTE VULVITIS: ICD-10-CM

## 2021-04-15 PROCEDURE — G0101: CPT

## 2021-04-15 PROCEDURE — 99397 PER PM REEVAL EST PAT 65+ YR: CPT | Mod: GY

## 2021-04-15 RX ORDER — CLOBETASOL PROPIONATE 0.5 MG/G
0.05 CREAM TOPICAL
Qty: 1 | Refills: 6 | Status: ACTIVE | COMMUNITY
Start: 2019-11-08 | End: 1900-01-01

## 2021-06-04 PROBLEM — N76.2 ALLERGIC VULVITIS: Status: ACTIVE | Noted: 2021-06-04

## 2021-06-04 NOTE — PHYSICAL EXAM
[Appropriately responsive] : appropriately responsive [Alert] : alert [No Acute Distress] : no acute distress [No Lymphadenopathy] : no lymphadenopathy [Regular Rate Rhythm] : regular rate rhythm [No Murmurs] : no murmurs [Clear to Auscultation B/L] : clear to auscultation bilaterally [Soft] : soft [Non-tender] : non-tender [Non-distended] : non-distended [No HSM] : No HSM [No Lesions] : no lesions [No Mass] : no mass [Oriented x3] : oriented x3 [Examination Of The Breasts] : a normal appearance [No Masses] : no breast masses were palpable [Labia Majora] : normal [Labia Minora] : normal [Normal] : normal [Uterine Adnexae] : normal yes

## 2021-06-04 NOTE — HISTORY OF PRESENT ILLNESS
[FreeTextEntry1] : pt comes for pap . She has no complaints . Would like cream for when she feels some itching at vulva . She has seen the oncologist and has follow up appointment .

## 2022-03-25 NOTE — ED ADULT TRIAGE NOTE - ARRIVAL FROM
HOSPITAL PRE-OPERATIVE INSTRUCTIONS    Patient Name: Benita Zuniga   Procedure: AVILA & WATCHMAN DEVICE IMPLANT  Surgery Date: 4/13/2022 Arrival Time: 11:00am Surgery Time: 1:30pm    [facility] Directions:  - Park on the north side of the hospital. Enter at the outpatient entrance on the east side of the [facility] just north or the ER entrance.   -  A  will call to pre-register. If not called prior to 48 hours before surgery, please call [phone].  - You will receive a call from the Pre Admission Nurse from [facility].  If you do not receive a call from the nurse with in 24 hours please call [phone].     PRE-ADMISSION TESTING:        Imaging: CORONARY CTA of the CHEST - Yes - Please call the [facility] at [phone] to schedule your test. They are located at [address] [place] [address].Please have your test completed by 4/4/2022    DYE ALLERGY PROTOCOL FOR THE CTA: TAKE ONE (1) TABLET OF EACH OF THE FOLLOWING THE EVENING PRIOR & THE MORNING OF THE CTA OF CHEST :  PREDNISONE 50 MG 13 hours, 7 hours and 1 hour prior to CT  BENADRYL 50 mg 1 hour prior to CT    MEDICATIONS:  · HOLD Eliquis 1 DAY(S) PRIOR TO YOUR PROCEDURE.  THE LAST DOSE WILL BE ON 4/11/2022.   · YOU MAY TAKE THE REST OF YOUR MEDICATIONS AS PRESCRIBED.  · DYE ALLERGY PROTOCOL ON 4/13/2022: TAKE ONE (1) TABLET OF EACH OF THE FOLLOWING THE EVENING PRIOR & THE MORNING OF THE PROCEDURE - PREDNISONE 50 MG, BENADRYL 25 MG, & TAGAMET 200 MG    If you develop a cold or fever, sore throat or runny nose, please notify your doctor immediately.     EATING AND DRINKING GUIDELINES:  · NOTHING TO EAT OR DRINK AFTER MIDNIGHT THE EVENING PRIOR TO YOUR PROCEDURE.  · NO LOTION ON YOUR CHEST THE DAY OF THE PROCEDURE.   · You will need someone to drive you home.  An adult must stay with you for the first 24 hours after your surgery.    · Do not wear jewelry  · Please no  make-up  · Bring inhalers or nebulizer  · Do not wear contacts  · Do not bring valuables  · Wear loose fitting clothing  · Have fluids and soft bland food available at home for initial recovery period    : Gwen Phone: [phone]     Home

## 2023-04-05 ENCOUNTER — APPOINTMENT (OUTPATIENT)
Dept: NEUROSURGERY | Facility: CLINIC | Age: 78
End: 2023-04-05
Payer: MEDICARE

## 2023-04-05 VITALS — BODY MASS INDEX: 28 KG/M2 | WEIGHT: 164 LBS | HEIGHT: 64 IN

## 2023-04-05 DIAGNOSIS — Z85.9 PERSONAL HISTORY OF MALIGNANT NEOPLASM, UNSPECIFIED: ICD-10-CM

## 2023-04-05 DIAGNOSIS — S32.010A WEDGE COMPRESSION FRACTURE OF FIRST LUMBAR VERTEBRA, INITIAL ENCOUNTER FOR CLOSED FRACTURE: ICD-10-CM

## 2023-04-05 PROCEDURE — 99204 OFFICE O/P NEW MOD 45 MIN: CPT
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
## 2023-04-24 ENCOUNTER — APPOINTMENT (OUTPATIENT)
Dept: OBGYN | Facility: CLINIC | Age: 78
End: 2023-04-24
Payer: MEDICARE

## 2023-04-24 ENCOUNTER — LABORATORY RESULT (OUTPATIENT)
Age: 78
End: 2023-04-24

## 2023-04-24 VITALS
HEART RATE: 73 BPM | SYSTOLIC BLOOD PRESSURE: 150 MMHG | BODY MASS INDEX: 29.18 KG/M2 | DIASTOLIC BLOOD PRESSURE: 83 MMHG | WEIGHT: 170 LBS | OXYGEN SATURATION: 99 %

## 2023-04-24 DIAGNOSIS — Z01.419 ENCOUNTER FOR GYNECOLOGICAL EXAMINATION (GENERAL) (ROUTINE) W/OUT ABNORMAL FINDINGS: ICD-10-CM

## 2023-04-24 DIAGNOSIS — N39.41 URGE INCONTINENCE: ICD-10-CM

## 2023-04-24 PROCEDURE — 99397 PER PM REEVAL EST PAT 65+ YR: CPT

## 2023-04-24 PROCEDURE — 99213 OFFICE O/P EST LOW 20 MIN: CPT | Mod: 25

## 2023-04-24 NOTE — HISTORY OF PRESENT ILLNESS
[FreeTextEntry1] : pt feels good , has urge incontinence that started a few weeks ago without pains or symptoms of uti . She has no loss when coughing just when she gets the urge . Needs pap .

## 2023-05-05 DIAGNOSIS — N39.0 URINARY TRACT INFECTION, SITE NOT SPECIFIED: ICD-10-CM

## 2023-05-05 RX ORDER — SULFAMETHOXAZOLE AND TRIMETHOPRIM 400; 80 MG/1; MG/1
400-80 TABLET ORAL TWICE DAILY
Qty: 10 | Refills: 0 | Status: ACTIVE | COMMUNITY
Start: 2023-05-05 | End: 1900-01-01

## 2023-11-28 ENCOUNTER — EMERGENCY (EMERGENCY)
Facility: HOSPITAL | Age: 78
LOS: 0 days | Discharge: HOME | End: 2023-11-28
Attending: STUDENT IN AN ORGANIZED HEALTH CARE EDUCATION/TRAINING PROGRAM
Payer: MEDICARE

## 2023-11-28 VITALS
RESPIRATION RATE: 19 BRPM | HEART RATE: 82 BPM | OXYGEN SATURATION: 100 % | TEMPERATURE: 96 F | SYSTOLIC BLOOD PRESSURE: 222 MMHG | DIASTOLIC BLOOD PRESSURE: 100 MMHG

## 2023-11-28 VITALS — HEART RATE: 80 BPM | DIASTOLIC BLOOD PRESSURE: 89 MMHG | SYSTOLIC BLOOD PRESSURE: 162 MMHG
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
## 2023-11-28 DIAGNOSIS — S52.611A DISPLACED FRACTURE OF RIGHT ULNA STYLOID PROCESS, INITIAL ENCOUNTER FOR CLOSED FRACTURE: ICD-10-CM

## 2023-11-28 DIAGNOSIS — M25.531 PAIN IN RIGHT WRIST: ICD-10-CM

## 2023-11-28 DIAGNOSIS — S20.214A CONTUSION OF MIDDLE FRONT WALL OF THORAX, INITIAL ENCOUNTER: ICD-10-CM

## 2023-11-28 DIAGNOSIS — R07.9 CHEST PAIN, UNSPECIFIED: ICD-10-CM

## 2023-11-28 DIAGNOSIS — H26.9 UNSPECIFIED CATARACT: Chronic | ICD-10-CM
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
## 2023-11-28 DIAGNOSIS — S52.501A UNSPECIFIED FRACTURE OF THE LOWER END OF RIGHT RADIUS, INITIAL ENCOUNTER FOR CLOSED FRACTURE: ICD-10-CM
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
## 2023-11-28 PROCEDURE — 71046 X-RAY EXAM CHEST 2 VIEWS: CPT

## 2023-11-28 PROCEDURE — 73110 X-RAY EXAM OF WRIST: CPT | Mod: RT

## 2023-11-28 PROCEDURE — 99285 EMERGENCY DEPT VISIT HI MDM: CPT | Mod: 25

## 2023-11-28 PROCEDURE — 25605 CLTX DST RDL FX/EPHYS SEP W/: CPT | Mod: 54,RT

## 2023-11-28 PROCEDURE — 99284 EMERGENCY DEPT VISIT MOD MDM: CPT | Mod: 57

## 2023-11-28 PROCEDURE — 73090 X-RAY EXAM OF FOREARM: CPT | Mod: 26,RT

## 2023-11-28 PROCEDURE — 73090 X-RAY EXAM OF FOREARM: CPT | Mod: RT

## 2023-11-28 PROCEDURE — 25605 CLTX DST RDL FX/EPHYS SEP W/: CPT | Mod: RT

## 2023-11-28 PROCEDURE — 73110 X-RAY EXAM OF WRIST: CPT | Mod: 26,RT

## 2023-11-28 PROCEDURE — 71046 X-RAY EXAM CHEST 2 VIEWS: CPT | Mod: 26

## 2023-11-28 RX ORDER — ACETAMINOPHEN 500 MG
975 TABLET ORAL ONCE
Refills: 0 | Status: COMPLETED | OUTPATIENT
Start: 2023-11-28 | End: 2023-11-28

## 2023-11-28 RX ADMIN — Medication 975 MILLIGRAM(S): at 20:47

## 2023-11-28 NOTE — ED PROVIDER NOTE - CARE PROVIDER_API CALL
John Sandoval  Orthopaedic Surgery  [address] [place] [address]  Phone: [phone]  Fax: [phone]  Follow Up Time: 7-10 Days

## 2023-11-28 NOTE — ED PROCEDURE NOTE - CPROC ED INFORMED CONSENT1
Alert-The patient is alert, awake and responds to voice. The patient is oriented to time, place, and person. The triage nurse is able to obtain subjective information.
Benefits, risks, and possible complications of procedure explained to patient/caregiver who verbalized understanding and gave verbal consent.
Benefits, risks, and possible complications of procedure explained to patient/caregiver who verbalized understanding and gave verbal consent.

## 2023-11-28 NOTE — ED ADULT NURSE NOTE - NSFALLRISKINTERV_ED_ALL_ED

## 2023-11-28 NOTE — ED PROVIDER NOTE - PHYSICAL EXAMINATION
Physical Exam    Vital Signs: I have reviewed the initial vital signs.  Constitutional: well-nourished, appears stated age, no acute distress  Eyes: Conjunctiva pink, Sclera clear, PERRLA, EOMI without pain. no nystagmus.  Cardiovascular: S1 and S2, regular rate, regular rhythm, well-perfused extremities, radial pulses equal and 2+ b/l.   Respiratory: unlabored respiratory effort, clear to auscultation bilaterally no wheezing, rales and rhonchi. pt is speaking full sentences. no accessory muscle use. (+) middle anterior chest tenderness with ecchymosis without crepitus. no flail chest.   Gastrointestinal: soft, non-tender, nondistended abdomen, no pulsatile mass, no rebound, no guarding  Musculoskeletal: supple neck, no lower extremity edema, no calf tenderness, no midline tenderness, no palpable spinal step offs, (+) right wrist tenderness, swelling, and deformity. no tenderness over the right fingers b/l. no tenderness over the right elbow. FROM of right digits without pain. FROM of left upper extremity, and b/l lower extremities without pain.   Integumentary: warm, dry, no rash  Neurologic: awake, alert, median, radial, and ulnar nerve motor and sensory functions grossly intact b/l. steady gait.   Psychiatric: appropriate mood, appropriate affect

## 2023-11-28 NOTE — ED PROVIDER NOTE - PROGRESS NOTE DETAILS
FF: right wrist fracture reduced and splinted. pt advised of strict return precautions discussed at bedside agreeable to dc. f/u with ortho.

## 2023-11-28 NOTE — ED PROVIDER NOTE - PATIENT PORTAL LINK FT
You can access the FollowMyHealth Patient Portal offered by [facility] by registering at the following website: [email or web address]. By joining [employer]’s FollowMyHealth portal, you will also be able to view your health information using other applications (apps) compatible with our system.

## 2023-11-28 NOTE — ED PROVIDER NOTE - NSFOLLOWUPINSTRUCTIONS_ED_ALL_ED_FT
Our Emergency Department Referral Coordinators will be reaching out to you in the next 24-48 hours from 9:00am to 5:00pm with a follow up appointment. Please expect a phone call from the hospital in that time frame. If you do not receive a call or if you have any questions or concerns, you can reach them at   [phone]      Wrist Fracture Treated With Immobilization  A wrist fracture is a break or crack in one of the bones of your wrist. Your wrist is made up of eight small bones at the palm of your hand (carpal bones) and two long bones that make up your forearm (radius and ulna).    If the joint is stable and the bones are still in their normal position (nondisplaced), the injury may be treated with immobilization. This involves the use of a cast, splint, or sling to hold your arm in place. Immobilization ensures that your bones continue to stay in the correct position while your arm is healing.    What are the causes?  This condition may be caused by:    A direct force to the wrist.  Falling on an outstretched hand.  Trauma, such as a car accident or a fall.    What increases the risk?  This condition is more likely to develop in people who:    Do contact and high-risk sports, such as skiing, biking, and ice skating.  Take steroid medicines.  Smoke.  Are female.  Are .  Drink more than three alcoholic beverages per day.  Have low or lowered bone density (osteoporosis or osteopenia).  Are older.  Have a history of previous fractures.    What are the signs or symptoms?  Symptoms of this condition include:    Pain.  Swelling.  Bruising.  Not being able to move the wrist normally.    Additionally, the wrist may hang in an odd position or appear deformed.    How is this diagnosed?  This condition may be diagnosed based on a physical exam and X-rays. You may also have a CT scan or MRI.    How is this treated?  Treatment for this condition involves wearing a cast or splint until the injured area is stable enough for you to begin range-of-motion exercises. You also may be given a sling. You may also be prescribed pain medicine.    Follow these instructions at home:  If you have a splint:     Wear the splint as told by your health care provider. Remove it only as told by your health care provider.  Loosen the splint if your fingers tingle, become numb, or turn cold and blue.  Do not let your splint get wet if it is not waterproof.  Keep the splint clean.  If you have a sling:     Wear it as told by your health care provider. Remove it only as told by your health care provider.  If you have a cast:     Do not stick anything inside the cast to scratch your skin. Doing that increases your risk of infection.  Check the skin around the cast every day. Report any concerns to your health care provider. You may put lotion on dry skin around the edges of the cast. Do not apply lotion to the skin underneath the cast.  Do not let your cast get wet if it is not waterproof.  Keep the cast clean.  Bathing     Do not take baths, swim, or use a hot tub until your health care provider approves. Ask your health care provider if you can take showers. You may only be allowed to take sponge baths for bathing.  If your cast or splint is not waterproof, cover it with a watertight plastic bag when you take a bath or a shower.  If you have a sling, remove it for bathing only if your health care provider tells you that it is safe to do that.  Managing pain, stiffness, and swelling     If directed, apply ice to the injured area.    Put ice in a plastic bag.  Place a towel between your skin and the bag.  Leave the ice on for 20 minutes, 2–3 times per day.    Move your fingers often to avoid stiffness and to lessen swelling.  Raise (elevate) the injured area above the level of your heart while you are sitting or lying down.  Driving     Do not drive or operate heavy machinery while taking prescription pain medicine.  Ask your health care provider when it is safe to drive if you have a cast, splint, or sling on your wrist.  Activity     Return to your normal activities as told by your health care provider. Ask your health care provider what activities are safe for you.  Do range-of-motion exercises only as told by your health care provider or physical therapist.  General instructions     Do not put pressure on any part of the cast or splint until it is fully hardened. This may take several hours.  Do not use any tobacco products, such as cigarettes, chewing tobacco, and e-cigarettes. Tobacco can delay bone healing. If you need help quitting, ask your health care provider.  Take over-the-counter and prescription medicines only as told by your health care provider.  Keep all follow-up visits as told by your health care provider. This is important.  Contact a health care provider if:  Your cast, splint, or sling is damaged or loose.  You have any new pain, swelling, or bruising.  Your pain, swelling, and bruising do not improve.  You have a fever.  You have chills.  Get help right away if:  Your skin or fingers on your injured arm turn blue or gray.  Your arm feels cold or gets numb.  You have severe pain in your injured wrist.  This information is not intended to replace advice given to you by your health care provider. Make sure you discuss any questions you have with your health care provider.    ECCHYMOSIS - AfterCare(R) Instructions(ER/ED)     Ecchymosis    WHAT YOU NEED TO KNOW:    Ecchymosis is a collection of blood under the skin. Blood leaks from blood vessels and collects in nearby tissues. This can happen anywhere just below the skin, or in a mucus membrane, such as your mouth. Ecchymosis may appear as a large red, blue, or purple area of skin. You may also have pain or swelling in the area. Signs and symptoms may move to nearby body areas. It is important for you to follow up with your healthcare provider. Some causes of ecchymosis are serious and need medical treatment.    DISCHARGE INSTRUCTIONS:    Contact your healthcare provider if:     You have new symptoms.      Your bruise suddenly gets bigger and feels hard.       The affected area does not improve within 2 weeks.      You have ecchymosis around your eye and you are having trouble seeing.      You have questions or concerns about your condition or care.    Self-care:     Rest the area to help the tissues heal.       Apply ice to the area to relieve pain and swelling. Ice can also help prevent tissue damage. Use an ice pack, or put crushed ice in a bag. Cover the ice pack or bag with a small towel before you apply it to your skin. Apply ice for 20 minutes every hour, or as directed.       Elevate the affected area to reduce swelling, and to improve circulation. Prop the area on pillows to keep it elevated above the level of your heart. Do this as often as possible.      NSAID medicines such as ibuprofen can help reduce pain and swelling. NSAIDs are available without a prescription. Ask your healthcare provider which medicine is right for you. Ask how much to take and how often to take it. Follow directions. NSAIDs can cause stomach bleeding and kidney damage if not taken correctly. If you take blood thinner medicine, always ask if NSAIDs are safe for you.    Follow up with your healthcare provider as directed: Write down your questions so you remember to ask them during your visits.       © Copyright [employer] 2020       back to top                      © Copyright [employer] 2020

## 2023-11-28 NOTE — ED PROVIDER NOTE - OBJECTIVE STATEMENT
78-year-old female with no significant past medical history who presents to the ED for evaluation right wrist pain and chest pain status post mechanical trip and fall while walking to the bathroom landing on her outstretched hands and hitting the front of her chest on the edge of the bathtub.  Patient reports she is right-hand dominant and has fractured her right wrist before.  Patient denies head injury, LOC, use of blood thinners, dizziness, visual changes, neck pain, back pain, shortness of breath, abdominal pain, nausea, vomiting, diarrhea, constipation, weakness, numbness, or tingling.

## 2023-11-28 NOTE — ED PROVIDER NOTE - CLINICAL SUMMARY MEDICAL DECISION MAKING FREE TEXT BOX
78-year-old female, past medical history of sciatica, left wrist fracture in July 2020, presenting for right wrist pain after mechanical trip and fall earlier today onto an outstretched hand.  She states she slipped in the bathroom and hit her chest on the bathtub and has mild pain there as well only when she moves not to palpation.  Denies head trauma, AC use, nausea, vomiting, dizziness, shortness of breath, abdominal pain, numbness.  On exam patient has a deformed right distal wrist.  Radial pulse 2+ palpable.  Able to range all digits.  No scaphoid tenderness. Sensation intact.  Full range of motion nontender at elbow.  No tenderness noted to chest wall anterior or bilateral.  No back tenderness midline or paraspinal.  Imaging ordered and reviewed by me.  Distal impacted radius fracture noted. Fracture reduced and splinted. Given strict return precautions and follow up with orthopedics. Patient comfortable with plan.

## 2023-12-01 ENCOUNTER — APPOINTMENT (OUTPATIENT)
Dept: ORTHOPEDIC SURGERY | Facility: CLINIC | Age: 78
End: 2023-12-01
Payer: MEDICARE

## 2023-12-01 VITALS — BODY MASS INDEX: 28.17 KG/M2 | WEIGHT: 165 LBS | HEIGHT: 64 IN

## 2023-12-01 PROCEDURE — 99203 OFFICE O/P NEW LOW 30 MIN: CPT

## 2023-12-06 ENCOUNTER — APPOINTMENT (OUTPATIENT)
Dept: ORTHOPEDIC SURGERY | Facility: CLINIC | Age: 78
End: 2023-12-06
Payer: MEDICARE

## 2023-12-06 PROCEDURE — 99204 OFFICE O/P NEW MOD 45 MIN: CPT

## 2023-12-06 PROCEDURE — 73110 X-RAY EXAM OF WRIST: CPT | Mod: RT

## 2023-12-14 ENCOUNTER — APPOINTMENT (OUTPATIENT)
Dept: ORTHOPEDIC SURGERY | Facility: CLINIC | Age: 78
End: 2023-12-14
Payer: MEDICARE

## 2023-12-14 PROCEDURE — 99213 OFFICE O/P EST LOW 20 MIN: CPT | Mod: 25

## 2023-12-14 PROCEDURE — 29075 APPL CST ELBW FNGR SHORT ARM: CPT | Mod: RT

## 2023-12-14 NOTE — ASSESSMENT
[FreeTextEntry1] : 78-year-old female 3 weeks status post closed distal right radius fracture.  She opted for conservative management.  She is here in follow-up and presents in her initial splint.  She reports her pain is well-controlled.  Physical examination right wrist: Mild swelling appreciated throughout.  Tender to palpation right distal radius.  Nontender to the scaphoid anatomic snuffbox.  Range of motion limited secondary to pain and swelling.  Sensorimotor intact distally.  Neuro vas intact.  I removed the patient's splint in the office today and transitioned her to a short arm fiberglass cast with a mold.  She tolerated Procedure Well.  She Is since Then Does Not Get the Cast Wet.  I See the Patient Back in about 3 to 4 Weeks for Repeat X-Ray First before Cast Is Removed.  She Will Call with Any Questions or Concerns.  Advised for Gentle Range and Motion Activity Modification. All questions and concerns addressed to patient's satisfaction. Patient expresses full understanding of treatment plan.

## 2024-01-05 ENCOUNTER — APPOINTMENT (OUTPATIENT)
Dept: ORTHOPEDIC SURGERY | Facility: CLINIC | Age: 79
End: 2024-01-05
Payer: MEDICARE

## 2024-01-05 PROCEDURE — 99213 OFFICE O/P EST LOW 20 MIN: CPT | Mod: 25

## 2024-01-05 PROCEDURE — 73110 X-RAY EXAM OF WRIST: CPT | Mod: RT

## 2024-01-05 PROCEDURE — L3908: CPT | Mod: RT

## 2024-01-05 NOTE — ASSESSMENT
[FreeTextEntry1] : 78-year-old female 6 weeks status post right distal radius fracture.  She opted for nonoperative management.  Her cast was removed in the office today.  She reports her pain is well-controlled at this time patient does report some swelling.  Overall she is doing well.  Denies any numbness or tingling.  Physical examination of right wrist: Moderate swelling appreciated throughout.  No ecchymosis erythema appreciated skin is intact to mycins palpation of the right distal radius.  Range of motion limited secondary to pain and swelling.  Sensorimotor intact distally.  Neuro vas intact.  Can make a full fist at this time.  X-rays of right wrist taken the office today reveal acceptable alignment of fracture pattern with routine healing.  No change in alignment of fracture pattern versus most previous x-rays at latest visit.  The patient was removed from her cast today and treated changes to a right cock-up wrist brace that which she utilize at all times especially when active.  She may remove it when showering and/or sleeping.Encouraged very gentle range of motion activity modification within limitations of pain.  I will see her back in 2 to 3 weeks for repeat x-rays and final evaluation/treatment. All questions and concerns addressed to patient's satisfaction. Patient expresses full understanding of treatment plan.

## 2024-01-19 ENCOUNTER — APPOINTMENT (OUTPATIENT)
Dept: ORTHOPEDIC SURGERY | Facility: CLINIC | Age: 79
End: 2024-01-19
Payer: MEDICARE

## 2024-01-19 DIAGNOSIS — S52.501A UNSPECIFIED FRACTURE OF THE LOWER END OF RIGHT RADIUS, INITIAL ENCOUNTER FOR CLOSED FRACTURE: ICD-10-CM

## 2024-01-19 PROCEDURE — 99213 OFFICE O/P EST LOW 20 MIN: CPT

## 2024-01-19 PROCEDURE — 73110 X-RAY EXAM OF WRIST: CPT | Mod: RT

## 2024-01-19 NOTE — ASSESSMENT
[FreeTextEntry1] : 78-year-old female 6 weeks status post right distal radius fracture.  She opted for nonoperative management.  Her cast was removed in the office today.  She reports her pain is well-controlled at this time patient does report some swelling.  Overall she is doing well.  Denies any numbness or tingling.  Physical examination of right wrist: Moderate swelling appreciated throughout.  No ecchymosis erythema appreciated skin is intact to mycins palpation of the right distal radius.  Range of motion limited secondary to pain and swelling.  Sensorimotor intact distally.  Neuro vas intact.  Can make a full fist at this time.  X-rays of right wrist taken the office today reveal acceptable alignment of fracture pattern with routine healing.  No change in alignment of fracture pattern versus most previous x-rays at latest visit.  She has been utilizing the cock-up wrist brace.  She reports very minor pain at this time.  She does have very minor decreased range of motion but is improving overall.  Red flag symptoms discussed.  Encouraged her to start discontinuing the use of the cock-up wrist brace and work on gentle range of motion activity modification.  Red flag symptoms discussed.  Let her know that she is still having difficulty with pain and range of motion about a month or 2 from now to give us a call and we will send her for therapy.  Patient is agreeable to treatment plan. All questions and concerns addressed to patient's satisfaction. Patient expresses full understanding of treatment plan.

## 2024-03-06 ENCOUNTER — APPOINTMENT (OUTPATIENT)
Dept: ORTHOPEDIC SURGERY | Facility: CLINIC | Age: 79
End: 2024-03-06
Payer: MEDICARE

## 2024-03-06 DIAGNOSIS — M85.89 OTHER SPECIFIED DISORDERS OF BONE DENSITY AND STRUCTURE, MULTIPLE SITES: ICD-10-CM

## 2024-03-06 DIAGNOSIS — M75.51 BURSITIS OF RIGHT SHOULDER: ICD-10-CM

## 2024-03-06 PROCEDURE — 73030 X-RAY EXAM OF SHOULDER: CPT | Mod: RT

## 2024-03-06 PROCEDURE — 20610 DRAIN/INJ JOINT/BURSA W/O US: CPT | Mod: RT

## 2024-03-06 PROCEDURE — 99213 OFFICE O/P EST LOW 20 MIN: CPT | Mod: 25

## 2024-03-06 NOTE — DISCUSSION/SUMMARY
[de-identified] : SHE DID HAVE A FALL AS SHE WAS RUNNING AND TRIPPED OVER THE RUG AND LANDING ON A BAR AND HAD A LARGE CONTUSION BY HER UPPER RIBS AND A RIGHT DISTAL RADIUS FRACTURE FOR WHICH SHE HAS BEEN SUCCESSFULLY TREATED ELSEWHERE IN THIS OFFICE AND SHE REALLY HAS RECOVERED OTHER THAN CONTINUED PAIN IN HER RIGHT SHOULDER.  THE PAIN IS QUITE SEVERE AND WAKES HER UP AT NIGHT AND IS PRESENT WHEN SHE WAKES UP IN THE MORNING.  IS NOT GOTTEN BETTER WITH CONSERVATIVE TREATMENT AND SHE STRONGLY REQUESTS CORTISONE.  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  AFTER DISCUSSING WITH THEM AT LENGTH THE NATURE OF THEIR PROBLEM AND THE ALTERNATIVES OF DIFFERENT TREATMENTS, WE FURTHER DISCUSSED THE RISKS AND BENEFITS AND EXPECTED OUTCOME OF A STEROID INJECTION WITH NUMBING MEDICINE.  THEY UNDERSTAND THAT THIS OFTEN WILL CAUSE AT LEAST TEMPORARY IMPROVEMENT.  THEY ALSO UNDERSTAND THAT IT HELPS US CONFIRM THAT THE AREA INJECTED IS THE PAIN GENERATOR.  THEY UNDERSTAND THE SMALL RISK OF INFECTION AND THAT SOMETIMES THE ANTI-INFLAMMATORY EFFECT CAN TAKE UP TO 2 WEEKS TO WORK.  THEY UNDERSTAND THAT USUALLY THE IMPROVEMENT LASTS A FEW MONTHS BUT OFTEN IT LASTS LESS TIME.  THEY DO UNDERSTAND THAT OCCASIONALLY THE INJECTION DOES PUT OUT THE INFLAMMATION AND THE PROBLEM GOES AWAY FOR A VERY EXTENDED TIMEFRAME.  THEY HAD THE  OPPORTUNITY TO HAVE ALL THEIR QUESTIONS ANSWERED AND UNDER STERILE CONDITIONS WE HAVE INJECTED THEM WITH LONG AND SHORT ACTING NUMBING MEDICINE (4 CC OF 2% MARCAINE OR EQUIVALENT AND 4 CC OF LIDOCAINE AND 20 MG OF DEXAMETHASONE) WITH GOOD IMMEDIATE RELIEF OF PAIN  OVERALL PHYSICAL EXAM GENERAL: Well developed well nourished in no acute distress   MENTAL:Alert and oriented x3, normal affect, Pleasant and accompanied by family   MUSCULOSKELETAL: Ambulating independently   HEENT: NCAT, EOM intact, mucosa moist, neck supple with adequate free rom   CARDIOVASCULAR/HEMATOLOGICAL: no JVD, no peripheral edema, good capillary refill,  skin warm and well perfused, no venous stasis ulcers, pulses intact, no pallor or superficial non-traumatic bruising   NEUROLOGICAL: free passive rom without rigidity or cog wheel motion   RESPIRATORY: no gross stridor or wheezing or increased respiratory effort or use of O2, good capil refill and color   INTEGUMENTARY: skin intact without obvious suspicious lesions where examined  Right SHOULDER EXAM No  postural asymmetry, no scapular winging, NVI   ROM: abduction>90; FF>90, IR to opposite shoulder, ER to back of head, adduction 30 but with discomfort at terminal range of motion  Impingement:  positive NEER with forearm in pronation and supination, positive Morse-Cornel, positive  empty can test for pain by anterior and lateral aspect of shoulder near acromion and AC joint on affected side and virtually negative on contralateral side    Speed and Gilmer and subscapular lift off test positive for discomfort but they are able to do the motions against mild resistance location:      We are also going to send her for therapy and have her follow-up as needed

## 2024-03-26 NOTE — ED ADULT NURSE NOTE - FINAL NURSING ELECTRONIC SIGNATURE
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    A conference call was had with Faby (sister), Yesy (sister) & Estee (niece) regarding this decision. The feel pressured to make a decision on her behalf, but feel that she would want everything to be done to save her life. While he would not want to be on hemodialysis, they said that she would be able to make a decision on whether to continue hemodialysis if/when she comes to it. 11-Jul-2020 18:13

## 2024-06-27 ENCOUNTER — APPOINTMENT (OUTPATIENT)
Dept: OBGYN | Facility: CLINIC | Age: 79
End: 2024-06-27

## 2024-06-27 VITALS — WEIGHT: 158 LBS | DIASTOLIC BLOOD PRESSURE: 100 MMHG | SYSTOLIC BLOOD PRESSURE: 174 MMHG | BODY MASS INDEX: 27.12 KG/M2

## 2024-06-27 PROCEDURE — 99213 OFFICE O/P EST LOW 20 MIN: CPT

## 2024-07-22 PROBLEM — R15.9 RECTAL INCONTINENCE: Status: ACTIVE | Noted: 2024-07-22

## 2024-08-05 ENCOUNTER — APPOINTMENT (OUTPATIENT)
Dept: ORTHOPEDIC SURGERY | Facility: CLINIC | Age: 79
End: 2024-08-05

## 2024-08-05 PROCEDURE — 99213 OFFICE O/P EST LOW 20 MIN: CPT

## 2024-08-05 PROCEDURE — 73080 X-RAY EXAM OF ELBOW: CPT | Mod: RT

## 2024-08-05 PROCEDURE — 73110 X-RAY EXAM OF WRIST: CPT | Mod: RT

## 2024-08-05 NOTE — HISTORY OF PRESENT ILLNESS
[de-identified] : 78-year-old female is here today with her son for evaluation of her right elbow and wrist.  Patient [place], this happened on Thursday.  She went to the urgent care had x-rays taken and was told she had a fracture in her elbow.  They did not take x-rays of her wrist.  She states she is also having some pain in her wrist.  She has had previous fractures in this wrist in the past.  She was seen here for them as well.  Pain is worse with movement.  She denies any numbness or tingling in the hand or fingers.

## 2024-08-05 NOTE — DISCUSSION/SUMMARY
[de-identified] : At this time I recommend she continue wearing the sling.  Discussed with her she can take the sling off to do gentle range of motion.  No heavy lifting.  She can alternate between ice and warm compresses, Tylenol or anti-inflammatories as needed for pain.  We will see her back in 2 weeks for repeat x-rays and evaluation with Dr. Kendrick.  She has been treated by Dr. Kendrick in the past. Patient will call me if any other problems or concerns.  Patient verbalized understanding and agreed with the plan, all questions were answered in the office today.

## 2024-08-05 NOTE — IMAGING
[de-identified] : On examination of her right arm, she was in a sugar-tong splint that was removed.  She has swelling of the elbow, no ecchymosis.  She has some irritation on the skin around the elbow due to the splint.  No open wound or erythema.  On examination of her right elbow she has decreased range of motion with extension, she is not able to fully extend the elbow.  She is tender to palpation over the radial head.  No tenderness over the medial or lateral epicondyles.  Tenderness over the olecranon.  No tenderness to palpation over the midshaft of the forearm.  No point tenderness to the distal radius, she has some tenderness over the distal ulna.  No tenderness to palpation over the metacarpals or the fingers.  She is able to open and close her fist, sensation intact throughout, 2+ radial pulse.  X-rays taken at the urgent care reviewed in the office today of the right elbow show a slightly displaced intra-articular radial head fracture.  X-rays repeated in the office today of the right elbow show unchanged alignment.  No other fractures, dislocations, or other bony abnormalities noted. X-rays taken of the right wrist show chronic deformity due to her previous fracture, no acute fractures, dislocations, or other bony abnormalities noted.

## 2024-08-19 NOTE — ASU PATIENT PROFILE, ADULT - NSTOBACCO TYPE_GEN_A_CORE_RD
RUE grossly 4/5, LUE 3/5, BLE 3+/5. Bilateral  strength WNL on Right, markedly weaker on Left side. Cigarettes

## 2024-08-20 ENCOUNTER — APPOINTMENT (OUTPATIENT)
Dept: ORTHOPEDIC SURGERY | Facility: CLINIC | Age: 79
End: 2024-08-20

## 2024-08-20 ENCOUNTER — APPOINTMENT (OUTPATIENT)
Dept: ORTHOPEDIC SURGERY | Facility: CLINIC | Age: 79
End: 2024-08-20
Payer: MEDICARE

## 2024-08-20 DIAGNOSIS — S52.121A DISPLACED FRACTURE OF HEAD OF RIGHT RADIUS, INITIAL ENCOUNTER FOR CLOSED FRACTURE: ICD-10-CM

## 2024-08-20 PROCEDURE — 99213 OFFICE O/P EST LOW 20 MIN: CPT

## 2024-08-20 PROCEDURE — 73080 X-RAY EXAM OF ELBOW: CPT | Mod: RT

## 2024-08-20 NOTE — IMAGING
[de-identified] : On examination of her right arm, she was in a sugar-tong splint that was removed.  She has swelling of the elbow, no ecchymosis.  She has some irritation on the skin around the elbow due to the splint.  No open wound or erythema.  On examination of her right elbow she has decreased range of motion with extension, she is not able to fully extend the elbow.  She is tender to palpation over the radial head.  No tenderness over the medial or lateral epicondyles.  Tenderness over the olecranon.  No tenderness to palpation over the midshaft of the forearm.  No point tenderness to the distal radius, she has some tenderness over the distal ulna.  No tenderness to palpation over the metacarpals or the fingers.  She is able to open and close her fist, sensation intact throughout, 2+ radial pulse.  X-rays taken in the office today show a slightly displaced intra-articular radial head fracture. unchanged alignment.  No other fractures, dislocations, or other bony abnormalities noted.

## 2024-08-20 NOTE — DISCUSSION/SUMMARY
[de-identified] : The patient is doing well overall.  Patient  has been utilizing her right arm sling that which she can discontinue moving forwards.  Strongly encouraged her to work on range of motion at the elbow.  Specifically extension and pronation/supination.  X-rays were discussed in depth and reveal acceptable alignment of fracture pattern with routine healing.  Advised no heavy lifting, pushing, or pulling at this time.  I encouraged activity modification within the limitations of pain.  Risks and benefits were discussed.  Red flag symptoms were discussed.  Red flag symptoms were discussed.  X-rays were discussed in depth. All questions and concerns addressed to patient's satisfaction. Patient expresses full understanding of treatment plan. I will give the patient follow-up in 1 month for repeat x-rays and further evaluation/treatment.

## 2024-09-10 ENCOUNTER — APPOINTMENT (OUTPATIENT)
Dept: ORTHOPEDIC SURGERY | Facility: CLINIC | Age: 79
End: 2024-09-10
Payer: MEDICARE

## 2024-09-10 DIAGNOSIS — S52.121A DISPLACED FRACTURE OF HEAD OF RIGHT RADIUS, INITIAL ENCOUNTER FOR CLOSED FRACTURE: ICD-10-CM

## 2024-09-10 PROCEDURE — 73080 X-RAY EXAM OF ELBOW: CPT | Mod: RT

## 2024-09-10 PROCEDURE — 99214 OFFICE O/P EST MOD 30 MIN: CPT

## 2024-09-10 NOTE — ASSESSMENT
[FreeTextEntry1] : Patient comes in status post right radial head fracture.  She is doing well.  She is been working range of motion.  She does not have complaints.  Right upper extremity: Minimally tender ovation over radial head, full extension, slightly decreased flexion compared to the opposite side by about 10 to 15 degrees, full supination, full pronation, neurovascular intact  X-ray of the right elbow 3 views show acceptable alignment radial head fracture  The patient was advised of the diagnosis.  The natural history of the pathology was explained in full to the patient in layman's terms. All questions were answered.  We discussed that the radial head has a function in mobility and support of the elbow joint.  Minimally displaced fractures of the radial head/neck generally do well with non operative treatment.  We reviewed that although many people lose terminal extension, this is tolerated and that functional range of motion is considered to be  flexion extension and 50/50 supination/pronation.  The patient understands that they need to work on range of motion exercises and that Xrays and range of motion will be re-evaluated in about 3 weeks.  Formal therapy may be needed.  For comfort I have advised the patient that they may wear a sling for up to 3 days after the injury but then should begin range of motion exercises to minimize stiffness. Prolonged sling wear may cause permanent stiffness.  Surgery is occasionally required for contracture release or fixation/radial head arthroplasty.  The patient verbalized understanding of the above. Patient will continue working on range of motion on her own.  She will let pain be her guide.  She will follow-up with me as needed.

## 2024-12-06 ENCOUNTER — APPOINTMENT (OUTPATIENT)
Dept: ORTHOPEDIC SURGERY | Facility: CLINIC | Age: 79
End: 2024-12-06
Payer: MEDICARE

## 2024-12-06 DIAGNOSIS — S89.91XA UNSPECIFIED INJURY OF RIGHT LOWER LEG, INITIAL ENCOUNTER: ICD-10-CM

## 2024-12-06 PROCEDURE — 20610 DRAIN/INJ JOINT/BURSA W/O US: CPT | Mod: RT

## 2024-12-06 PROCEDURE — 73562 X-RAY EXAM OF KNEE 3: CPT | Mod: RT

## 2024-12-06 PROCEDURE — 99203 OFFICE O/P NEW LOW 30 MIN: CPT | Mod: 25

## 2025-01-10 ENCOUNTER — APPOINTMENT (OUTPATIENT)
Dept: ORTHOPEDIC SURGERY | Facility: CLINIC | Age: 80
End: 2025-01-10
Payer: MEDICARE

## 2025-01-10 DIAGNOSIS — S89.91XA UNSPECIFIED INJURY OF RIGHT LOWER LEG, INITIAL ENCOUNTER: ICD-10-CM

## 2025-01-10 PROCEDURE — 99213 OFFICE O/P EST LOW 20 MIN: CPT
